# Patient Record
Sex: FEMALE | Race: BLACK OR AFRICAN AMERICAN | NOT HISPANIC OR LATINO | URBAN - METROPOLITAN AREA
[De-identification: names, ages, dates, MRNs, and addresses within clinical notes are randomized per-mention and may not be internally consistent; named-entity substitution may affect disease eponyms.]

---

## 2017-08-25 ENCOUNTER — HOSPITAL ENCOUNTER (EMERGENCY)
Facility: CLINIC | Age: 47
Discharge: HOME | End: 2017-08-26
Attending: EMERGENCY MEDICINE | Admitting: EMERGENCY MEDICINE
Payer: COMMERCIAL

## 2017-08-25 DIAGNOSIS — R00.2 PALPITATIONS: ICD-10-CM

## 2017-08-25 LAB
ANION GAP SERPL CALCULATED.3IONS-SCNC: 8 MMOL/L (ref 3–14)
BASOPHILS # BLD AUTO: 0 10E9/L (ref 0–0.2)
BASOPHILS NFR BLD AUTO: 0.3 %
BUN SERPL-MCNC: 15 MG/DL (ref 7–30)
CALCIUM SERPL-MCNC: 7.8 MG/DL (ref 8.5–10.1)
CHLORIDE SERPL-SCNC: 114 MMOL/L (ref 94–109)
CO2 SERPL-SCNC: 21 MMOL/L (ref 20–32)
CREAT SERPL-MCNC: 0.72 MG/DL (ref 0.52–1.04)
DIFFERENTIAL METHOD BLD: ABNORMAL
EOSINOPHIL # BLD AUTO: 0.2 10E9/L (ref 0–0.7)
EOSINOPHIL NFR BLD AUTO: 5.1 %
ERYTHROCYTE [DISTWIDTH] IN BLOOD BY AUTOMATED COUNT: 13.4 % (ref 10–15)
GFR SERPL CREATININE-BSD FRML MDRD: 87 ML/MIN/1.7M2
GLUCOSE SERPL-MCNC: 76 MG/DL (ref 70–99)
HCT VFR BLD AUTO: 37.3 % (ref 35–47)
HGB BLD-MCNC: 12.3 G/DL (ref 11.7–15.7)
IMM GRANULOCYTES # BLD: 0 10E9/L (ref 0–0.4)
IMM GRANULOCYTES NFR BLD: 0 %
LYMPHOCYTES # BLD AUTO: 1.7 10E9/L (ref 0.8–5.3)
LYMPHOCYTES NFR BLD AUTO: 55.4 %
MCH RBC QN AUTO: 31.4 PG (ref 26.5–33)
MCHC RBC AUTO-ENTMCNC: 33 G/DL (ref 31.5–36.5)
MCV RBC AUTO: 95 FL (ref 78–100)
MONOCYTES # BLD AUTO: 0.2 10E9/L (ref 0–1.3)
MONOCYTES NFR BLD AUTO: 7.1 %
NEUTROPHILS # BLD AUTO: 1 10E9/L (ref 1.6–8.3)
NEUTROPHILS NFR BLD AUTO: 32.1 %
NRBC # BLD AUTO: 0 10*3/UL
NRBC BLD AUTO-RTO: 0 /100
PLATELET # BLD AUTO: 189 10E9/L (ref 150–450)
POTASSIUM SERPL-SCNC: 3.1 MMOL/L (ref 3.4–5.3)
RBC # BLD AUTO: 3.92 10E12/L (ref 3.8–5.2)
SODIUM SERPL-SCNC: 143 MMOL/L (ref 133–144)
TROPONIN I SERPL-MCNC: <0.015 UG/L (ref 0–0.04)
WBC # BLD AUTO: 3.1 10E9/L (ref 4–11)

## 2017-08-25 PROCEDURE — 99284 EMERGENCY DEPT VISIT MOD MDM: CPT | Mod: 25 | Performed by: EMERGENCY MEDICINE

## 2017-08-25 PROCEDURE — 84484 ASSAY OF TROPONIN QUANT: CPT | Performed by: EMERGENCY MEDICINE

## 2017-08-25 PROCEDURE — 80048 BASIC METABOLIC PNL TOTAL CA: CPT | Performed by: EMERGENCY MEDICINE

## 2017-08-25 PROCEDURE — 96365 THER/PROPH/DIAG IV INF INIT: CPT | Performed by: EMERGENCY MEDICINE

## 2017-08-25 PROCEDURE — 93005 ELECTROCARDIOGRAM TRACING: CPT | Performed by: EMERGENCY MEDICINE

## 2017-08-25 PROCEDURE — 85025 COMPLETE CBC W/AUTO DIFF WBC: CPT | Performed by: EMERGENCY MEDICINE

## 2017-08-25 PROCEDURE — 25000128 H RX IP 250 OP 636: Performed by: EMERGENCY MEDICINE

## 2017-08-25 PROCEDURE — 93010 ELECTROCARDIOGRAM REPORT: CPT | Mod: Z6 | Performed by: EMERGENCY MEDICINE

## 2017-08-25 RX ORDER — POTASSIUM CL/LIDO/0.9 % NACL 10MEQ/0.1L
10 INTRAVENOUS SOLUTION, PIGGYBACK (ML) INTRAVENOUS ONCE
Status: COMPLETED | OUTPATIENT
Start: 2017-08-25 | End: 2017-08-26

## 2017-08-25 RX ADMIN — Medication 10 MEQ: at 23:19

## 2017-08-25 ASSESSMENT — ENCOUNTER SYMPTOMS
LIGHT-HEADEDNESS: 1
PALPITATIONS: 1
CHEST TIGHTNESS: 1

## 2017-08-25 NOTE — ED AVS SNAPSHOT
" [facility], Emergency Department    [address] [address]    Phone:  [phone]                                       Asa Francis   MRN: [id number]    Department:  [facility], Emergency Department   Date of Visit:  8/25/2017           Patient Information     Date Of Birth          1970        Your diagnoses for this visit were:     Palpitations        You were seen by Raza Cheng MD.        Discharge Instructions       Please make an appointment to follow up with Cardiology Clinic (phone: [phone]) as soon as possible.    Eat and drink plenty of fluids.          *HEART PALPITATIONS    Palpitations refers to the feeling that your heart is beating hard, fast or irregular. Some people describe it as \"pounding\" or \"skipped beats\". Palpitations may occur in persons with heart disease, but can also occur in healthy persons. Your doctor does not believe that anything dangerous is causing your symptoms at this time.  Heart-related causes:    Arrhythmia (a change from the heart's normal rhythm)    Disease of the heart valves  Non-heart-related causes:    Certain medicines (such as asthma inhalers and decongestants)    Some herbal supplements, energy drinks and pills, and weight loss pills    Illegal stimulant drugs (such as cocaine, crank, methamphetamine, PCP)    Caffeine, alcohol and tobacco    Medical conditions such as thyroid disease, anemia, anxiety and panic disorder  Sometimes the cause cannot be found.  HOME CARE:  1. Avoid excess caffeine, alcohol, tobacco and any stimulant drugs.  2. Tell your doctor about any prescription or over-the-counter or herbal medicines you take.  FOLLOW UP with your doctor or as advised by our staff.  GET PROMPT MEDICAL ATTENTION if any of the following occur together with palpitations:    Weakness, dizziness, light-headed or fainting    Chest pain or shortness of breath    Rapid heart rate (over 120 beats per minute, at " rest)    Palpitations that lasts over 20 minutes    Weakness of an arm or leg or one side of the face    Difficulty with speech or vision    8437-7320 [employer], [address] [place] [address], [place] [address]. All rights reserved. This information is not intended as a substitute for professional medical care. Always follow your healthcare professional's instructions.        24 Hour Appointment Hotline       To make an appointment at any [facility], call [phone] ([phone]). If you don't have a family doctor or clinic, we will help you find one. [facility] are conveniently located to serve the needs of you and your family.          ED Discharge Orders     Follow up with Cardiology       You should receive a call from [facility] to schedule your follow up appointment.  If you do not hear from them within 3 business days call [phone] for help in scheduling your testing and follow up.                     Review of your medicines      Our records show that you are taking the medicines listed below. If these are incorrect, please call your family doctor or clinic.        Dose / Directions Last dose taken    albuterol 108 (90 BASE) MCG/ACT Inhaler   Commonly known as:  PROAIR HFA/PROVENTIL HFA/VENTOLIN HFA   Dose:  2 puff        Inhale 2 puffs into the lungs every 6 hours   Refills:  0        cholecalciferol 1000 UNIT tablet   Commonly known as:  vitamin D   Dose:  1000 Units        Take 1,000 Units by mouth   Refills:  0        cyanocobalamin 1000 MCG Tabs   Dose:  1000 mcg        Take 1,000 mcg by mouth   Refills:  0        EUCERIN Lotn        Apply to face/ body twice daily as needed.   Refills:  0        loratadine 10 MG tablet   Commonly known as:  CLARITIN   Dose:  10 mg        Take 10 mg by mouth   Refills:  0        LORAZEPAM PO   Dose:  0.5 mg        Take 0.5 mg by mouth every 8 hours as needed for anxiety   Refills:  0        meclizine  12.5 MG tablet   Commonly known as:  ANTIVERT   Dose:  12.5 mg   Quantity:  20 tablet        Take 1 tablet (12.5 mg) by mouth 3 times daily as needed for dizziness   Refills:  0        MIRTAZAPINE PO   Dose:  45 mg        Take 45 mg by mouth At Bedtime   Refills:  0        NIFEDIPINE PO   Dose:  10 mg        Take 10 mg by mouth 2 times daily   Refills:  0        ondansetron 4 MG ODT tab   Commonly known as:  ZOFRAN-ODT   Dose:  4 mg   Quantity:  20 tablet        Take 1 tablet (4 mg) by mouth every 6 hours as needed for nausea   Refills:  0        pantoprazole 20 MG EC tablet   Commonly known as:  PROTONIX   Quantity:  30 tablet        Take by mouth 30-60 minutes before a meal.   Refills:  0                Procedures and tests performed during your visit     Basic metabolic panel    CBC with platelets differential    Cardiac Continuous Monitoring    EKG 12-lead, tracing only    Troponin I      Orders Needing Specimen Collection     None      Pending Results     Date and Time Order Name Status Description    8/25/2017 2151 EKG 12-lead, tracing only Preliminary             Pending Culture Results     No orders found for last 3 day(s).            Pending Results Instructions     If you had any lab results that were not finalized at the time of your Discharge, you can call the ED Lab Result RN at [phone]. You will be contacted by this team for any positive Lab results or changes in treatment. The nurses are available 7 days a week from 10A to 6:30P.  You can leave a message 24 hours per day and they will return your call.        Thank you for choosing [facility]       Thank you for choosing [facility] for your care. Our goal is always to provide you with excellent care. Hearing back from our patients is one way we can continue to improve our services. Please take a few minutes to complete the written survey that you may receive in the mail after you visit with us. Thank you!        MyChart Information     [facility] lets  "you send messages to your doctor, view your test results, renew your prescriptions, schedule appointments and more. To sign up, go to [email or web address] . Click on \"Log in\" on the left side of the screen, which will take you to the Welcome page. Then click on \"Sign up Now\" on the right side of the page.     You will be asked to enter the access code listed below, as well as some personal information. Please follow the directions to create your username and password.     Your access code is: [email or web address]  Expires: 2017 12:39 AM     Your access code will  in 90 days. If you need help or a new code, please call your [place] clinic or [phone].        Care EveryWhere ID     This is your Care EveryWhere ID. This could be used by other organizations to access your [place] medical records  [id number]        Equal Access to Services     GENEVIEVE GIBBS : Cyndi [place], waaxsukumar kaur, qaybta kaalmasukumar beltre, evelia wallace . So [facility] [phone].    ATENCIÓN: Si habla español, tiene a salazar disposición servicios gratuitos de asistencia lingüística. Llame al [phone].    We comply with applicable federal civil rights laws and [place] laws. We do not discriminate on the basis of race, color, national origin, age, disability sex, sexual orientation or gender identity.            After Visit Summary       This is your record. Keep this with you and show to your community pharmacist(s) and doctor(s) at your next visit.                  "

## 2017-08-25 NOTE — ED AVS SNAPSHOT
[facility], [place], Emergency Department    [address] [place] [address]    Phone:  [phone]                                       Asa Francis   MRN: [id number]    Department:  [facility], Emergency Department   Date of Visit:  8/25/2017           After Visit Summary Signature Page     I have received my discharge instructions, and my questions have been answered. I have discussed any challenges I see with this plan with the nurse or doctor.    ..........................................................................................................................................  Patient/Patient Representative Signature      ..........................................................................................................................................  Patient Representative Print Name and Relationship to Patient    ..................................................               ................................................  Date                                            Time    ..........................................................................................................................................  Reviewed by Signature/Title    ...................................................              ..............................................  Date                                                            Time

## 2017-08-26 VITALS
SYSTOLIC BLOOD PRESSURE: 112 MMHG | OXYGEN SATURATION: 100 % | TEMPERATURE: 97.8 F | DIASTOLIC BLOOD PRESSURE: 75 MMHG | RESPIRATION RATE: 16 BRPM

## 2017-08-26 NOTE — ED PROVIDER NOTES
History     Chief Complaint   Patient presents with     Palpitations     A  was used (Gibraltarian).     Asa Francis is a 47 year old female who presents with palpitations. The patient reports that she felt her heart racing around 9:00 PM today, which lasted several minutes, and reports that she was unable to move because she felt faint and lightheaded, she had to go down to the ground. She complains of chest congestion and chest tightness associated with the heart racing sensation. Currently, the patient reports that the heart racing and chest tightness have resolved and she feels back to her baseline. She states that she has had similar episodes in the past, though they only last a few minutes at a time, and tonight the episode lasted much longer. She notes that she has been evaluated for chest pain at [facility] in the past, however, she has not previously been evaluated for her heart racing. The patient reports that she has been fasting for the past 3 days, has not been eating anything, and only drank water after the onset of the heart racing today. Currently, the patient reports feeling improved.     Past Medical History:   Diagnosis Date     Diabetes (H)      Hypertension      MDD (major depressive disorder)      PTSD (post-traumatic stress disorder)        Past Surgical History:   Procedure Laterality Date     EYE SURGERY       Right Arm Surgery         No family history on file.    Social History   Substance Use Topics     Smoking status: Never Smoker     Smokeless tobacco: Not on file     Alcohol use No     No current facility-administered medications for this encounter.      Current Outpatient Prescriptions   Medication     meclizine (ANTIVERT) 12.5 MG tablet     ondansetron (ZOFRAN-ODT) 4 MG disintegrating tablet     pantoprazole (PROTONIX) 20 MG tablet     albuterol (PROAIR HFA, PROVENTIL HFA, VENTOLIN HFA) 108 (90 BASE) MCG/ACT inhaler     LORAZEPAM PO     NIFEDIPINE PO      MIRTAZAPINE PO     Emollient (EUCERIN) LOTN     cholecalciferol (VITAMIN D) 1000 UNIT tablet     cyanocobalamin 1000 MCG TABS     loratadine (CLARITIN) 10 MG tablet      No Known Allergies     I have reviewed the Medications, Allergies, Past Medical and Surgical History, and Social History in the Epic system.    Review of Systems   Respiratory: Positive for chest tightness.    Cardiovascular: Positive for palpitations (heart racing).   Neurological: Positive for light-headedness.   All other systems reviewed and are negative.      Physical Exam   SpO2: 100 %  Physical Exam   Constitutional: No distress.   HENT:   Head: Atraumatic.   Mouth/Throat: Oropharynx is clear and moist. No oropharyngeal exudate.   Eyes: Pupils are equal, round, and reactive to light. No scleral icterus.   Neck: Neck supple.   Cardiovascular: Normal heart sounds and intact distal pulses.    No murmur heard.  Pulmonary/Chest: Breath sounds normal. No respiratory distress. She has no wheezes. She has no rales.   Abdominal: Soft. Bowel sounds are normal. She exhibits no distension. There is no tenderness.   Musculoskeletal: She exhibits no edema or tenderness.   Skin: Skin is warm. No rash noted. She is not diaphoretic.       ED Course     ED Course     Procedures     10:29 PM  The patient was seen and examined by Dr. Cheng in Room 9.               EKG Interpretation:      Interpreted by Raza Cheng MD  Time reviewed: On arrival  Symptoms at time of EKG: palpitations   Rhythm: normal sinus   Rate: 82 bpm  Axis: Left Axis Deviation  Ectopy: none  Conduction: normal  ST Segments/ T Waves: No ST-T wave changes  Q Waves: none  Comparison to prior: Unchanged    Clinical Impression: no acute changes            Critical Care time:  none           Labs Ordered and Resulted from Time of ED Arrival Up to the Time of Departure from the ED   CBC WITH PLATELETS DIFFERENTIAL - Abnormal; Notable for the following:        Result Value    WBC 3.1 (*)      Absolute Neutrophil 1.0 (*)     All other components within normal limits   BASIC METABOLIC PANEL - Abnormal; Notable for the following:     Potassium 3.1 (*)     Chloride 114 (*)     Calcium 7.8 (*)     All other components within normal limits   TROPONIN I   CARDIAC CONTINUOUS MONITORING   PULSE OXIMETRY NURSING            Assessments & Plan (with Medical Decision Making)   The patient had an episode of near syncope and was found to have a narrow complex tachycardia by medics.  The rhythm completing marches out with equal R to R intervals.  She has been fasting for several days and was somewhat dehydrated and may have had sinus tachycardia but given her age and the significant improvement to a heart rate of 85 with 1 L normal saline this is more likely an episode of SVT.  She does have a mildly prolonged QTC, however the tachycardia was narrow complex and she has no prior episodes of tachycardia or syncope.  She felt better after normal saline.  She was given IV potassium to replace her level and will go home and eat.  She will follow up with cardiology for the possible SVT to consider an event monitor.    I have reviewed the nursing notes.    I have reviewed the findings, diagnosis, plan and need for follow up with the patient.    Discharge Medication List as of 8/26/2017 12:39 AM          Final diagnoses:   Palpitations     IAmy, am serving as a trained medical scribe to document services personally performed by Raza Cheng MD, based on the provider's statements to me.   IRaza MD, was physically present and have reviewed and verified the accuracy of this note documented by Amy Bashir.    8/25/2017   [facility], [place], EMERGENCY DEPARTMENT     Raza Cheng MD  08/26/17 0107

## 2017-08-26 NOTE — ED NOTES
Patient has been fasting for an unknown amount of time. Patient developed chest pain, tachy for 's-170's, with hypotension. Pt received bolus of fluid in route and 325 of asa. HR 82 upon arrival to ED

## 2017-08-26 NOTE — DISCHARGE INSTRUCTIONS
"Please make an appointment to follow up with Cardiology Clinic (phone: [phone]) as soon as possible.    Eat and drink plenty of fluids.          *HEART PALPITATIONS    Palpitations refers to the feeling that your heart is beating hard, fast or irregular. Some people describe it as \"pounding\" or \"skipped beats\". Palpitations may occur in persons with heart disease, but can also occur in healthy persons. Your doctor does not believe that anything dangerous is causing your symptoms at this time.  Heart-related causes:    Arrhythmia (a change from the heart's normal rhythm)    Disease of the heart valves  Non-heart-related causes:    Certain medicines (such as asthma inhalers and decongestants)    Some herbal supplements, energy drinks and pills, and weight loss pills    Illegal stimulant drugs (such as cocaine, crank, methamphetamine, PCP)    Caffeine, alcohol and tobacco    Medical conditions such as thyroid disease, anemia, anxiety and panic disorder  Sometimes the cause cannot be found.  HOME CARE:  1. Avoid excess caffeine, alcohol, tobacco and any stimulant drugs.  2. Tell your doctor about any prescription or over-the-counter or herbal medicines you take.  FOLLOW UP with your doctor or as advised by our staff.  GET PROMPT MEDICAL ATTENTION if any of the following occur together with palpitations:    Weakness, dizziness, light-headed or fainting    Chest pain or shortness of breath    Rapid heart rate (over 120 beats per minute, at rest)    Palpitations that lasts over 20 minutes    Weakness of an arm or leg or one side of the face    Difficulty with speech or vision    2245-8401 [employer], [address] [address], [place] [address]. All rights reserved. This information is not intended as a substitute for professional medical care. Always follow your healthcare professional's instructions.      "

## 2017-08-26 NOTE — ED NOTES
Bed: ED09  Expected date:   Expected time:   Means of arrival:   Comments:  H 426  47 F  Palpitations, tachycardia

## 2017-08-27 LAB — INTERPRETATION ECG - MUSE: NORMAL

## 2017-09-25 ENCOUNTER — OFFICE VISIT (OUTPATIENT)
Dept: CARDIOLOGY | Facility: CLINIC | Age: 47
End: 2017-09-25
Attending: INTERNAL MEDICINE
Payer: COMMERCIAL

## 2017-09-25 ENCOUNTER — PRE VISIT (OUTPATIENT)
Dept: CARDIOLOGY | Facility: CLINIC | Age: 47
End: 2017-09-25

## 2017-09-25 VITALS
SYSTOLIC BLOOD PRESSURE: 107 MMHG | BODY MASS INDEX: 26.61 KG/M2 | OXYGEN SATURATION: 98 % | DIASTOLIC BLOOD PRESSURE: 71 MMHG | WEIGHT: 165.6 LBS | HEIGHT: 66 IN | HEART RATE: 71 BPM

## 2017-09-25 DIAGNOSIS — I47.10 PAROXYSMAL SUPRAVENTRICULAR TACHYCARDIA (H): ICD-10-CM

## 2017-09-25 DIAGNOSIS — R00.2 PALPITATIONS: Primary | ICD-10-CM

## 2017-09-25 DIAGNOSIS — R00.2 PALPITATIONS: ICD-10-CM

## 2017-09-25 LAB
ANION GAP SERPL CALCULATED.3IONS-SCNC: 6 MMOL/L (ref 3–14)
BUN SERPL-MCNC: 9 MG/DL (ref 7–30)
CALCIUM SERPL-MCNC: 8.9 MG/DL (ref 8.5–10.1)
CHLORIDE SERPL-SCNC: 105 MMOL/L (ref 94–109)
CO2 SERPL-SCNC: 27 MMOL/L (ref 20–32)
CREAT SERPL-MCNC: 0.74 MG/DL (ref 0.52–1.04)
GFR SERPL CREATININE-BSD FRML MDRD: 84 ML/MIN/1.7M2
GLUCOSE SERPL-MCNC: 106 MG/DL (ref 70–99)
POTASSIUM SERPL-SCNC: 3.8 MMOL/L (ref 3.4–5.3)
SODIUM SERPL-SCNC: 138 MMOL/L (ref 133–144)

## 2017-09-25 PROCEDURE — 36415 COLL VENOUS BLD VENIPUNCTURE: CPT | Performed by: INTERNAL MEDICINE

## 2017-09-25 PROCEDURE — 99204 OFFICE O/P NEW MOD 45 MIN: CPT | Mod: ZP | Performed by: INTERNAL MEDICINE

## 2017-09-25 PROCEDURE — 80048 BASIC METABOLIC PNL TOTAL CA: CPT | Performed by: INTERNAL MEDICINE

## 2017-09-25 PROCEDURE — 99213 OFFICE O/P EST LOW 20 MIN: CPT | Mod: ZF

## 2017-09-25 ASSESSMENT — PAIN SCALES - GENERAL: PAINLEVEL: NO PAIN (0)

## 2017-09-25 NOTE — PATIENT INSTRUCTIONS
Labs today.   Schedule echocardiogram and heart monitor soon .   Follow up with Dr. Meza in 2 weeks.     Thank you for your visit today!    Please call [phone] with any questions or concersn.

## 2017-09-25 NOTE — TELEPHONE ENCOUNTER
Echo : 5/31/17  Left Ventricle:     Normal LV size, wall thickness, and systolic function. No gross regional wall motion                abnormalities identified.    Right Ventricle:    Normal RV size and function.    Left Atrium:        Normal size left atrium.    Right Atrium:       Normal size right atrium.    Aortic Valve:       Grossly normal in appearance and function.    Mitral Valve:       Mild to moderate mitral regurgitation.    Tricuspid Valve:    Mild tricuspid regurgitation.    Pulmonic Valve:     Trace pulmonic regurgitation.    Aorta:              Mildly dilated aortic root.    Pericardium:        No gross pericardial effusion.    IVC:    IAS:                Interatrial septum appears intact.     EKG : 8/25/17

## 2017-09-25 NOTE — NURSING NOTE
Chief Complaint   Patient presents with     New Patient     48 y/o female for evaluation of palpitations     Vitals were taken and medications were reconciled.  ERIK Doe  4:52 PM

## 2017-09-25 NOTE — LETTER
9/25/2017      RE: Asa Francis  [address]   [facility] [place] [address]       Dear Colleague,    Thank you for the opportunity to participate in the care of your patient, Asa Francis, at the [facility]ART [facility] at [facility]. Please see a copy of my visit note below.    Chief complaint: Palpitations    HPI: A  was used (Nicaraguan).   Ms. Asa Francis is a 47 year old  female who presents today with palpitations for the last 4 months. She reports daily palpitations once or twice a day for up to 15 min. She usually feels dizzy and chest pain during palpitations. On 8/25/2017 she felt palpitations again which lasted for about 45 min, associated with dizziness and she called paramedics. Paramedics took an ECG which showed narrow complex tachycardia (Regular RR intervals,  bpm). Then she presented to ER and her rhythm apperantly terminated spontaneously since ED provider notes doesnot mention about any medication being administered to terminate the tachycardia. She only received IV saline in ER and discharged home to follow up with cardiology. On September 6, 2017 she felt palpitations again while she was in bed that persisted for some time. Palpitations did not stop, she felt like she had an urged feeling to urinate, got up from bed and felt dizzy and fell on to the floor. She hit her head and had a big bruise on her forehead. Bruise is still seen today on her forehead.There wasnot anbody who witnessed the event. She was not able to get up, she stayed on the floor for some time, then she somehow managed to drink water and her palpitations stopped. She reports usually feeling very weak when her heart starts racing and she usually has to sit down. Her palpitations start gradually and stop gradually.  She has no known cardiac disease. She denies PND, ortopnea or LE edema      Risk factors: HT(-), DM(-), smoking (-), FH of palpitations  or CAD (-)            Personal, family, and social history reviewed with patient and revised.    PAST MEDICAL HISTORY:  PTSD    CURRENT MEDICATIONS:  Current Outpatient Prescriptions   Medication Sig Dispense Refill     meclizine (ANTIVERT) 12.5 MG tablet Take 1 tablet (12.5 mg) by mouth 3 times daily as needed for dizziness 20 tablet 0     ondansetron (ZOFRAN-ODT) 4 MG disintegrating tablet Take 1 tablet (4 mg) by mouth every 6 hours as needed for nausea 20 tablet 0     pantoprazole (PROTONIX) 20 MG tablet Take by mouth 30-60 minutes before a meal. 30 tablet 0     albuterol (PROAIR HFA, PROVENTIL HFA, VENTOLIN HFA) 108 (90 BASE) MCG/ACT inhaler Inhale 2 puffs into the lungs every 6 hours       LORAZEPAM PO Take 0.5 mg by mouth every 8 hours as needed for anxiety       MIRTAZAPINE PO Take 45 mg by mouth At Bedtime       Emollient (EUCERIN) LOTN Apply to face/ body twice daily as needed.       cholecalciferol (VITAMIN D) 1000 UNIT tablet Take 1,000 Units by mouth       cyanocobalamin 1000 MCG TABS Take 1,000 mcg by mouth       loratadine (CLARITIN) 10 MG tablet Take 10 mg by mouth         PAST SURGICAL HISTORY:  Past Surgical History:   Procedure Laterality Date     EYE SURGERY       Right Arm Surgery         ALLERGIES:   No Known Allergies    FAMILY HISTORY:  No family history on file.      SOCIAL HISTORY:  Social History   Substance Use Topics     Smoking status: Never Smoker     Smokeless tobacco: Not on file     Alcohol use No       ROS:   Constitutional: No fever, chills, or sweats. Weight stable.   ENT: No visual disturbance, ear ache, epistaxis, sore throat.   Cardiovascular: As per HPI.   Respiratory: No cough, hemoptysis.    GI: No nausea, vomiting, hematemesis, melena, or hematochezia.   : No hematuria.   Integument: Negative.   Psychiatric: Negative.   Hematologic:  Easy bruising, no easy bleeding.  Neuro: Negative.   Endocrinology: No significant heat or cold intolerance   Musculoskeletal: No  "myalgia.    Exam:  /71 (BP Location: Right arm, Patient Position: Chair, Cuff Size: Adult Regular)  Pulse 71  Ht 1.676 m (5' 6\")  Wt 75.1 kg (165 lb 9.6 oz)  SpO2 98%  BMI 26.73 kg/m2  GENERAL APPEARANCE: healthy, alert and no distress  HEENT: no icterus, no central cyanosis  LYMPH/NECK: no adenopathy, no asymmetry, JVP not elevated, no carotid bruits.  RESPIRATORY: lungs clear to auscultation - no rales, rhonchi or wheezes, no use of accessory muscles, no retractions, respirations are unlabored, normal respiratory rate  CARDIOVASCULAR: regular rhythm, normal S1, S2, no S3 or S4 and no murmur, click or rub, precordium quiet with normal PMI.  GI: soft, non tender  EXTREMITIES: peripheral pulses normal, no edema  NEURO: alert and oriented to person/place/time, normal speech,and affect  VASC: Radial, dorsalis pedis and posterior tibialis pulses are normal in volumes and symmetric bilaterally.   SKIN: ecchymose on right side of her forehead , no rashes     I have reviewed the labs and personally reviewed the EKG below and made my comment in the assessment and plan.    Labs:  CBC RESULTS:   Lab Results   Component Value Date    WBC 3.1 (L) 08/25/2017    RBC 3.92 08/25/2017    HGB 12.3 08/25/2017    HCT 37.3 08/25/2017    MCV 95 08/25/2017    MCH 31.4 08/25/2017    MCHC 33.0 08/25/2017    RDW 13.4 08/25/2017     08/25/2017       BMP RESULTS:  Lab Results   Component Value Date     09/25/2017    POTASSIUM 3.8 09/25/2017    CHLORIDE 105 09/25/2017    CO2 27 09/25/2017    ANIONGAP 6 09/25/2017     (H) 09/25/2017    BUN 9 09/25/2017    CR 0.74 09/25/2017    GFRESTIMATED 84 09/25/2017    GFRESTBLACK >90 09/25/2017    REJI 8.9 09/25/2017        INR RESULTS:  Lab Results   Component Value Date    INR 1.11 03/02/2015       Procedures:  Echocardiogram (3/3/2015)  Normal dobutamine stress echocardiogram. No wall motion abnormalities at rest   and with dobutamine infusion. Normal LV size and function at " rest and with   dobutamine infusion with LVEF increasing from 55-60% to 70-75%. No ECG   abnormalities at rest and with dobutamine infusion. No chest pain at rest and   with dobutamine infusion. No significant valvular abnormalities on screening   baseline images.    EKG dated 8/25/2017 showed SR,  V1-V3 with T wave inversion.    EKG dated 8/25/2017 shows narrow complex regular tachycardia at a heart rate of 150 bpm. Possible AVNRT    Assessment and Plan:     Mrs. Francis is a 47 year old lady who has started feeling palpitations for the last 4 months. No prior cardiac disease. She had two long lasting episodes. She has a documented SVT during one of these episodes that she eventually presented to ED on 8/25/2017. She recently had another long episode associated with dizziness and syncope. She hit her head and still has a healing bruise on her forehead. I have discussed with her the treatment options being medical therapy versus catheter ablation. Medical therapy doesnot prevent recurrences in 100% of the patients. She also has low BP which might make medical therapy challenging. I will obtain TTE and refer her to EP evaluation for further discussion of catheter ablation treatment. She is also thinking catheter ablation is a better option than medical therapy given long term success rates. In the mean time before her visit in EP she will wear a Zio Patch to further capture her daily episodes.     It was my absolute pleasure to meet  in the office today. Please donot hesitate to contact me if you have any questions or concerns.    Luis Eduardo LANCE MD  [facility] Division of Cardiology  Pager [phone]

## 2017-09-25 NOTE — MR AVS SNAPSHOT
After Visit Summary   9/25/2017    Asa Francis    MRN: [id number]           Patient Information     Date Of Birth          1970        Visit Information        Provider Department      9/25/2017 4:45 PM Luis Eduardo Callaway MD; [employer] [facility]        Today's Diagnoses     Palpitations    -  1      Care Instructions    Labs today.   Schedule echocardiogram and heart monitor soon .   Follow up with Dr. Meza in 2 weeks.     Thank you for your visit today!    Please call [phone] with any questions or concersn.                 Follow-ups after your visit        Your next 10 appointments already scheduled     Sep 25, 2017  6:45 PM CDT   Lab with  LAB   [facility] ([facility])    [address] [place] [id number] [phone]            Sep 27, 2017  2:00 PM CDT   Ech Complete with [id number]   [facility] ([facility])    [address] [place] [id number] [phone]           1. Please bring or wear a comfortable two-piece outfit. 2. You may eat, drink and take your normal medicines. 3. For any questions that cannot be answered, please contact the ordering physician            Sep 27, 2017  3:00 PM CDT   (Arrive by 2:45 PM)   HOLTER MONITOR VISIT with  Cvc Monitor Tech, TH   [facility] ([facility])    [address] [place] [id number] [phone]            Oct 09, 2017  4:30 PM CDT   (Arrive by 4:15 PM)   NEW ARRHYTHMIA with Cuco Meza MD   [facility] ([facility])    [address] [place] [id number] [phone]              Future tests that were ordered for you today     Open Future Orders        Priority Expected Expires Ordered    Basic metabolic panel Routine 9/30/2017 9/30/2017 9/25/2017    Echocardiogram Routine   "2018    Zio Patch Holter Routine  2017            Who to contact     If you have questions or need follow up information about today's clinic visit or your schedule please contact [facility] directly at [phone].  Normal or non-critical lab and imaging results will be communicated to you by MyChart, letter or phone within 4 business days after the clinic has received the results. If you do not hear from us within 7 days, please contact the clinic through [employer] or phone. If you have a critical or abnormal lab result, we will notify you by phone as soon as possible.  Submit refill requests through [employer] or call your pharmacy and they will forward the refill request to us. Please allow 3 business days for your refill to be completed.          Additional Information About Your Visit        MyChart Information     [employer] lets you send messages to your doctor, view your test results, renew your prescriptions, schedule appointments and more. To sign up, go to [email or web address] . Click on \"Log in\" on the left side of the screen, which will take you to the Welcome page. Then click on \"Sign up Now\" on the right side of the page.     You will be asked to enter the access code listed below, as well as some personal information. Please follow the directions to create your username and password.     Your access code is: [email or web address]  Expires: 2017 12:39 AM     Your access code will  in 90 days. If you need help or a new code, please call your [place] clinic or [phone].        Care EveryWhere ID     This is your Care EveryWhere ID. This could be used by other organizations to access your [facility] medical records  [id number]        Your Vitals Were     Pulse Height Pulse Oximetry BMI (Body Mass Index)          71 1.676 m (5' 6\") 98% 26.73 kg/m2         Blood Pressure from Last 3 Encounters:   17 107/71   17 112/75   03/03/15 93/62    Weight from " Last 3 Encounters:   09/25/17 75.1 kg (165 lb 9.6 oz)   03/02/15 86.6 kg (191 lb)                 Today's Medication Changes          These changes are accurate as of: 9/25/17  6:16 PM.  If you have any questions, ask your nurse or doctor.               Stop taking these medicines if you haven't already. Please contact your care team if you have questions.     NIFEDIPINE PO   Stopped by:  Luis Eduardo Callaway MD                    Primary Care Provider Office Phone # Fax #    [facility] [phone]       [address] [place], [place] [id number]        Equal Access to Services     [facility]: Hadii raza serra hadasho [place], waaxda luqadaha, qaybta kaalmada adeomaryada, evelia wallace . So [facility] [phone].    ATENCIÓN: Si habla español, tiene a salazar disposición servicios gratuitos de asistencia lingüística. Llame al [phone].    We comply with applicable federal civil rights laws and [place] laws. We do not discriminate on the basis of race, color, national origin, age, disability sex, sexual orientation or gender identity.            Thank you!     Thank you for choosing [facility]  for your care. Our goal is always to provide you with excellent care. Hearing back from our patients is one way we can continue to improve our services. Please take a few minutes to complete the written survey that you may receive in the mail after your visit with us. Thank you!             Your Updated Medication List - Protect others around you: Learn how to safely use, store and throw away your medicines at www.disposemymeds.org.          This list is accurate as of: 9/25/17  6:16 PM.  Always use your most recent med list.                   Brand Name Dispense Instructions for use Diagnosis    albuterol 108 (90 BASE) MCG/ACT Inhaler    PROAIR HFA/PROVENTIL HFA/VENTOLIN HFA     Inhale 2 puffs into the lungs every 6 hours        cholecalciferol 1000 UNIT tablet    vitamin  D     Take 1,000 Units by mouth        cyanocobalamin 1000 MCG Tabs      Take 1,000 mcg by mouth        EUCERIN Lotn      Apply to face/ body twice daily as needed.        loratadine 10 MG tablet    CLARITIN     Take 10 mg by mouth        LORAZEPAM PO      Take 0.5 mg by mouth every 8 hours as needed for anxiety        meclizine 12.5 MG tablet    ANTIVERT    20 tablet    Take 1 tablet (12.5 mg) by mouth 3 times daily as needed for dizziness    Dizziness       MIRTAZAPINE PO      Take 45 mg by mouth At Bedtime        ondansetron 4 MG ODT tab    ZOFRAN-ODT    20 tablet    Take 1 tablet (4 mg) by mouth every 6 hours as needed for nausea    Nausea       pantoprazole 20 MG EC tablet    PROTONIX    30 tablet    Take by mouth 30-60 minutes before a meal.    Esophageal reflux

## 2017-09-27 ENCOUNTER — ALLIED HEALTH/NURSE VISIT (OUTPATIENT)
Dept: CARDIOLOGY | Facility: CLINIC | Age: 47
End: 2017-09-27
Attending: INTERNAL MEDICINE
Payer: COMMERCIAL

## 2017-09-27 ENCOUNTER — RADIANT APPOINTMENT (OUTPATIENT)
Dept: CARDIOLOGY | Facility: CLINIC | Age: 47
End: 2017-09-27
Attending: INTERNAL MEDICINE

## 2017-09-27 DIAGNOSIS — R00.2 PALPITATIONS: ICD-10-CM

## 2017-09-27 PROCEDURE — 0296T ZIO PATCH HOLTER: CPT | Mod: ZF

## 2017-09-27 PROCEDURE — 0298T ZZC EXT ECG > 48HR TO 21 DAY REVIEW AND INTERPRETATN: CPT | Performed by: INTERNAL MEDICINE

## 2017-10-01 NOTE — PROGRESS NOTES
Chief complaint: Palpitations    HPI: A  was used ([place]alian).   Ms. Asa Francis is a 47 year old  female who presents today with palpitations for the last 4 months. She reports daily palpitations once or twice a day for up to 15 min. She usually feels dizzy and chest pain during palpitations. On 8/25/2017 she felt palpitations again which lasted for about 45 min, associated with dizziness and she called paramedics. Paramedics took an ECG which showed narrow complex tachycardia (Regular RR intervals,  bpm). Then she presented to ER and her rhythm apperantly terminated spontaneously since ED provider notes doesnot mention about any medication being administered to terminate the tachycardia. She only received IV saline in ER and discharged home to follow up with cardiology. On September 6, 2017 she felt palpitations again while she was in bed that persisted for some time. Palpitations did not stop, she felt like she had an urged feeling to urinate, got up from bed and felt dizzy and fell on to the floor. She hit her head and had a big bruise on her forehead. Bruise is still seen today on her forehead.There wasnot anbody who witnessed the event. She was not able to get up, she stayed on the floor for some time, then she somehow managed to drink water and her palpitations stopped. She reports usually feeling very weak when her heart starts racing and she usually has to sit down. Her palpitations start gradually and stop gradually.  She has no known cardiac disease. She denies PND, ortopnea or LE edema      Risk factors: HT(-), DM(-), smoking (-), FH of palpitations or CAD (-)            Personal, family, and social history reviewed with patient and revised.    PAST MEDICAL HISTORY:  PTSD    CURRENT MEDICATIONS:  Current Outpatient Prescriptions   Medication Sig Dispense Refill     meclizine (ANTIVERT) 12.5 MG tablet Take 1 tablet (12.5 mg) by mouth 3 times daily as needed for dizziness 20  "tablet 0     ondansetron (ZOFRAN-ODT) 4 MG disintegrating tablet Take 1 tablet (4 mg) by mouth every 6 hours as needed for nausea 20 tablet 0     pantoprazole (PROTONIX) 20 MG tablet Take by mouth 30-60 minutes before a meal. 30 tablet 0     albuterol (PROAIR HFA, PROVENTIL HFA, VENTOLIN HFA) 108 (90 BASE) MCG/ACT inhaler Inhale 2 puffs into the lungs every 6 hours       LORAZEPAM PO Take 0.5 mg by mouth every 8 hours as needed for anxiety       MIRTAZAPINE PO Take 45 mg by mouth At Bedtime       Emollient (EUCERIN) LOTN Apply to face/ body twice daily as needed.       cholecalciferol (VITAMIN D) 1000 UNIT tablet Take 1,000 Units by mouth       cyanocobalamin 1000 MCG TABS Take 1,000 mcg by mouth       loratadine (CLARITIN) 10 MG tablet Take 10 mg by mouth         PAST SURGICAL HISTORY:  Past Surgical History:   Procedure Laterality Date     EYE SURGERY       Right Arm Surgery         ALLERGIES:   No Known Allergies    FAMILY HISTORY:  No family history on file.      SOCIAL HISTORY:  Social History   Substance Use Topics     Smoking status: Never Smoker     Smokeless tobacco: Not on file     Alcohol use No       ROS:   Constitutional: No fever, chills, or sweats. Weight stable.   ENT: No visual disturbance, ear ache, epistaxis, sore throat.   Cardiovascular: As per HPI.   Respiratory: No cough, hemoptysis.    GI: No nausea, vomiting, hematemesis, melena, or hematochezia.   : No hematuria.   Integument: Negative.   Psychiatric: Negative.   Hematologic:  Easy bruising, no easy bleeding.  Neuro: Negative.   Endocrinology: No significant heat or cold intolerance   Musculoskeletal: No myalgia.    Exam:  /71 (BP Location: Right arm, Patient Position: Chair, Cuff Size: Adult Regular)  Pulse 71  Ht 1.676 m (5' 6\")  Wt 75.1 kg (165 lb 9.6 oz)  SpO2 98%  BMI 26.73 kg/m2  GENERAL APPEARANCE: healthy, alert and no distress  HEENT: no icterus, no central cyanosis  LYMPH/NECK: no adenopathy, no asymmetry, JVP not " elevated, no carotid bruits.  RESPIRATORY: lungs clear to auscultation - no rales, rhonchi or wheezes, no use of accessory muscles, no retractions, respirations are unlabored, normal respiratory rate  CARDIOVASCULAR: regular rhythm, normal S1, S2, no S3 or S4 and no murmur, click or rub, precordium quiet with normal PMI.  GI: soft, non tender  EXTREMITIES: peripheral pulses normal, no edema  NEURO: alert and oriented to person/place/time, normal speech,and affect  VASC: Radial, dorsalis pedis and posterior tibialis pulses are normal in volumes and symmetric bilaterally.   SKIN: ecchymose on right side of her forehead , no rashes     I have reviewed the labs and personally reviewed the EKG below and made my comment in the assessment and plan.    Labs:  CBC RESULTS:   Lab Results   Component Value Date    WBC 3.1 (L) 08/25/2017    RBC 3.92 08/25/2017    HGB 12.3 08/25/2017    HCT 37.3 08/25/2017    MCV 95 08/25/2017    MCH 31.4 08/25/2017    MCHC 33.0 08/25/2017    RDW 13.4 08/25/2017     08/25/2017       BMP RESULTS:  Lab Results   Component Value Date     09/25/2017    POTASSIUM 3.8 09/25/2017    CHLORIDE 105 09/25/2017    CO2 27 09/25/2017    ANIONGAP 6 09/25/2017     (H) 09/25/2017    BUN 9 09/25/2017    CR 0.74 09/25/2017    GFRESTIMATED 84 09/25/2017    GFRESTBLACK >90 09/25/2017    REJI 8.9 09/25/2017        INR RESULTS:  Lab Results   Component Value Date    INR 1.11 03/02/2015       Procedures:  Echocardiogram (3/3/2015)  Normal dobutamine stress echocardiogram. No wall motion abnormalities at rest   and with dobutamine infusion. Normal LV size and function at rest and with   dobutamine infusion with LVEF increasing from 55-60% to 70-75%. No ECG   abnormalities at rest and with dobutamine infusion. No chest pain at rest and   with dobutamine infusion. No significant valvular abnormalities on screening   baseline images.    EKG dated 8/25/2017 showed SR,  V1-V3 with T wave inversion.    EKG  dated 8/25/2017 shows narrow complex regular tachycardia at a heart rate of 150 bpm. Possible AVNRT    Assessment and Plan:     Mrs. Francis is a 47 year old lady who has started feeling palpitations for the last 4 months. No prior cardiac disease. She had two long lasting episodes. She has a documented SVT during one of these episodes that she eventually presented to ED on 8/25/2017. She recently had another long episode associated with dizziness and syncope. She hit her head and still has a healing bruise on her forehead. I have discussed with her the treatment options being medical therapy versus catheter ablation. Medical therapy doesnot prevent recurrences in 100% of the patients. She also has low BP which might make medical therapy challenging. I will obtain TTE and refer her to EP evaluation for further discussion of catheter ablation treatment. She is also thinking catheter ablation is a better option than medical therapy given long term success rates. In the mean time before her visit in EP she will wear a Zio Patch to further capture her daily episodes.     It was my absolute pleasure to meet  in the office today. Please donot hesitate to contact me if you have any questions or concerns.    Luis Eduardo LANCE MD  [facility] Division of Cardiology  Pager [phone]

## 2017-10-02 NOTE — NURSING NOTE
Per Dr. Luis Eduardo Callaway, patient to have 14 day Zio monitor placed.  Diagnosis: Palpitations  Monitor placed: Yes  Patient Instructed: Yes  Patient verbalized understanding: Yes  Holter # [id number]  Placed by ERIK Doe

## 2017-10-09 ENCOUNTER — OFFICE VISIT (OUTPATIENT)
Dept: CARDIOLOGY | Facility: CLINIC | Age: 47
End: 2017-10-09
Attending: INTERNAL MEDICINE
Payer: COMMERCIAL

## 2017-10-09 ENCOUNTER — PRE VISIT (OUTPATIENT)
Dept: CARDIOLOGY | Facility: CLINIC | Age: 47
End: 2017-10-09

## 2017-10-09 VITALS
SYSTOLIC BLOOD PRESSURE: 109 MMHG | WEIGHT: 168.5 LBS | OXYGEN SATURATION: 98 % | HEART RATE: 77 BPM | HEIGHT: 66 IN | BODY MASS INDEX: 27.08 KG/M2 | DIASTOLIC BLOOD PRESSURE: 74 MMHG

## 2017-10-09 DIAGNOSIS — I47.10 PAROXYSMAL SUPRAVENTRICULAR TACHYCARDIA (H): Primary | ICD-10-CM

## 2017-10-09 PROCEDURE — 99213 OFFICE O/P EST LOW 20 MIN: CPT | Mod: ZF

## 2017-10-09 PROCEDURE — 99205 OFFICE O/P NEW HI 60 MIN: CPT | Mod: GC | Performed by: INTERNAL MEDICINE

## 2017-10-09 ASSESSMENT — PAIN SCALES - GENERAL: PAINLEVEL: NO PAIN (0)

## 2017-10-09 NOTE — PATIENT INSTRUCTIONS
You are scheduled for an SVT ablation, at [facility] with Dr. Ly. The hospital is located at [address] [address] on the [place] of the [place]. The phone number is: [phone].  If you need to cancel this procedure, please call [phone].    Date:__10/26/17____  Time: __7 AM_____To the [facility] Waiting Room at the [facility]  Supraventricular Tachycardia Ablation (SVT)    1. Your history and physical will be completed by our nurse practitioner when you arrive.  2. Please do not eat anything for 8 hours prior to your procedure. You may have sips of water up until 2 hours prior to your arrival.  3. You may stay in the hospital overnight, and will need a .    Please do not hesitate to utilize [employer] or call us if you have any questions or concerns.    Libra HERRERA Procedure   [phone]

## 2017-10-09 NOTE — LETTER
10/9/2017      RE: Asa Francis  [address]   [facility] [address]       Dear Colleague,    Thank you for the opportunity to participate in the care of your patient, Asa Francis, at the [facility] HEART [facility]. Please see a copy of my visit note below.    Cardiac Electrophysiology Clinic    Chief Complaint:  Symptomatic SVT    HPI:  Ms Francis is a 46 yo female referred by Dr. Luis Eduardo Callaway for management of symptomatic supraventricular tachycardia, once associated with lightheadedness and a fall resulting in head trauma in 8/2017, now occurring once or twice weekly, lasting 10-15 min, manifesting as weakness and lightheadedness, perhaps triggered by emotional stress when she thinks about her family.  On the occasion that she felt presyncopal and fell she called EMS.  She converted spontaneously to sinus rhythm in the ambulance.  Strips of the SVT, scanned into our EPIC and stored in the Media section of Chart Review, show a regular, narrow complex tachycardia without clearly identifiable atrial depolarization.  She has been having paroxysms of SVT for the past several months.  She denies chest pain, dyspnea, orthopnea, and peripheral edema.      Current Outpatient Prescriptions on File Prior to Visit:  meclizine (ANTIVERT) 12.5 MG tablet Take 1 tablet (12.5 mg) by mouth 3 times daily as needed for dizziness   ondansetron (ZOFRAN-ODT) 4 MG disintegrating tablet Take 1 tablet (4 mg) by mouth every 6 hours as needed for nausea   pantoprazole (PROTONIX) 20 MG tablet Take by mouth 30-60 minutes before a meal.   albuterol (PROAIR HFA, PROVENTIL HFA, VENTOLIN HFA) 108 (90 BASE) MCG/ACT inhaler Inhale 2 puffs into the lungs every 6 hours   LORAZEPAM PO Take 0.5 mg by mouth every 8 hours as needed for anxiety   MIRTAZAPINE PO Take 45 mg by mouth At Bedtime   Emollient (EUCERIN) LOTN Apply to face/ body twice daily as needed.   cholecalciferol (VITAMIN  "D) 1000 UNIT tablet Take 1,000 Units by mouth   cyanocobalamin 1000 MCG TABS Take 1,000 mcg by mouth   loratadine (CLARITIN) 10 MG tablet Take 10 mg by mouth     No current facility-administered medications on file prior to visit.   No Known Allergies    Past Medical History:   Diagnosis Date     Diabetes (H)      Hypertension      MDD (major depressive disorder)      PTSD (post-traumatic stress disorder)      Past Surgical History:   Procedure Laterality Date     EYE SURGERY       Right Arm Surgery       No family history on file.  Social History     Social History     Marital status:      Spouse name: N/A     Number of children: N/A     Years of education: N/A     Occupational History     Not on file.     Social History Main Topics     Smoking status: Never Smoker     Smokeless tobacco: Not on file     Alcohol use No     Drug use: No     Sexual activity: Not on file     Other Topics Concern     Not on file     Social History Narrative     and kids were killed in civil war.         A complete review of systems is negative except as noted above in the HPI.      Physical Examination:  Vitals: /74 (BP Location: Left arm, Patient Position: Chair, Cuff Size: Adult Regular)  Pulse 77  Ht 1.665 m (5' 5.55\")  Wt 76.4 kg (168 lb 8 oz)  SpO2 98%  BMI 27.57 kg/m2  GENERAL APPEARANCE:  Comfortable appearing female with unlabored breathing sitting comfortably.  HEENT:  Anicteric sclerae. Conjugate gaze.  NECK:  Head wrap/shroud masked JVP.  CHEST:  Lungs are clear to auscultation.  CARDIOVASCULAR:  RRR with normal s1 and s2.  No murmur or rub.  Warm extremities with no peripheral edema.  ABDOMEN: soft, not tender or distended.  NEURO: alert and answers questions appropriately via .  PSYCH:  Pleasant, forthright, cooperative.  Not visibly agitated or anxious.  SKIN: no petechiae/ecchymoses.  Not jaundiced.      Labs, imaging, and procedures were reviewed:  Recent Labs   Lab Test  09/25/17   " 1828  08/25/17   2215  03/03/15   0143   TSH   --    --   2.62   HGB   --   12.3   --    CR  0.74  0.72   --      Echocardiogram on 9/27/17 showed LV EF 55-60%, normal RV function, and no significant valvular disease.  AUSTIN was 21.6 ml/m2 (normal).      Assessment and recommendations:  Ms Francis is a 46 yo female with severely symptomatic paroxysmal SVT once even lightheaded enough to fall and suffer head injury.  We discussed the risks and benefits of medical therapy with AV alessandro blocking agents (non-invasive but not 100% effective and may lower threshold for presyncope when in SVT because of lower BP) vs ablation (risk of vascular injury and bleeding, AV alessandro injury necessitating PPM placement, myocardial rupture and pericardial tamponade, among others).  She elected to pursue ablation and will be scheduled for the same.    She will follow up in clinic following the procedure.  I appreciate the chance to help with Ms Francis care. Please let me know if you have any questions or concerns.    I discussed the patient with Dr. Cuco Meza.    Ricky Brooks MD  Cardiovascular disease fellow    Attending: Patient seen and examined with Dr. Brooks. The history and physical findings are accurate as recorded. My additional findings, if any, have been incorporated into the body of the note. All labs, imaging studies, ECG and telemetry data have been reviewed personally. The assessment and plans outlined reflect our joint decision making.    Cuco Meza MD

## 2017-10-09 NOTE — PROGRESS NOTES
Cardiac Electrophysiology Clinic    Chief Complaint:  Symptomatic SVT    HPI:  Ms Francis is a 46 yo female referred by Dr. Luis Eduardo Callaway for management of symptomatic supraventricular tachycardia, once associated with lightheadedness and a fall resulting in head trauma in 8/2017, now occurring once or twice weekly, lasting 10-15 min, manifesting as weakness and lightheadedness, perhaps triggered by emotional stress when she thinks about her family.  On the occasion that she felt presyncopal and fell she called EMS.  She converted spontaneously to sinus rhythm in the ambulance.  Strips of the SVT, scanned into our EPIC and stored in the Media section of Chart Review, show a regular, narrow complex tachycardia without clearly identifiable atrial depolarization.  She has been having paroxysms of SVT for the past several months.  She denies chest pain, dyspnea, orthopnea, and peripheral edema.      Current Outpatient Prescriptions on File Prior to Visit:  meclizine (ANTIVERT) 12.5 MG tablet Take 1 tablet (12.5 mg) by mouth 3 times daily as needed for dizziness   ondansetron (ZOFRAN-ODT) 4 MG disintegrating tablet Take 1 tablet (4 mg) by mouth every 6 hours as needed for nausea   pantoprazole (PROTONIX) 20 MG tablet Take by mouth 30-60 minutes before a meal.   albuterol (PROAIR HFA, PROVENTIL HFA, VENTOLIN HFA) 108 (90 BASE) MCG/ACT inhaler Inhale 2 puffs into the lungs every 6 hours   LORAZEPAM PO Take 0.5 mg by mouth every 8 hours as needed for anxiety   MIRTAZAPINE PO Take 45 mg by mouth At Bedtime   Emollient (EUCERIN) LOTN Apply to face/ body twice daily as needed.   cholecalciferol (VITAMIN D) 1000 UNIT tablet Take 1,000 Units by mouth   cyanocobalamin 1000 MCG TABS Take 1,000 mcg by mouth   loratadine (CLARITIN) 10 MG tablet Take 10 mg by mouth     No current facility-administered medications on file prior to visit.   No Known Allergies    Past Medical History:   Diagnosis Date     Diabetes (H)       "Hypertension      MDD (major depressive disorder)      PTSD (post-traumatic stress disorder)      Past Surgical History:   Procedure Laterality Date     EYE SURGERY       Right Arm Surgery       No family history on file.  Social History     Social History     Marital status:      Spouse name: N/A     Number of children: N/A     Years of education: N/A     Occupational History     Not on file.     Social History Main Topics     Smoking status: Never Smoker     Smokeless tobacco: Not on file     Alcohol use No     Drug use: No     Sexual activity: Not on file     Other Topics Concern     Not on file     Social History Narrative     and kids were killed in civil war.         A complete review of systems is negative except as noted above in the HPI.      Physical Examination:  Vitals: /74 (BP Location: Left arm, Patient Position: Chair, Cuff Size: Adult Regular)  Pulse 77  Ht 1.665 m (5' 5.55\")  Wt 76.4 kg (168 lb 8 oz)  SpO2 98%  BMI 27.57 kg/m2  GENERAL APPEARANCE:  Comfortable appearing female with unlabored breathing sitting comfortably.  HEENT:  Anicteric sclerae. Conjugate gaze.  NECK:  Head wrap/shroud masked JVP.  CHEST:  Lungs are clear to auscultation.  CARDIOVASCULAR:  RRR with normal s1 and s2.  No murmur or rub.  Warm extremities with no peripheral edema.  ABDOMEN: soft, not tender or distended.  NEURO: alert and answers questions appropriately via .  PSYCH:  Pleasant, forthright, cooperative.  Not visibly agitated or anxious.  SKIN: no petechiae/ecchymoses.  Not jaundiced.      Labs, imaging, and procedures were reviewed:  Recent Labs   Lab Test  09/25/17   1828  08/25/17   2215  03/03/15   0143   TSH   --    --   2.62   HGB   --   12.3   --    CR  0.74  0.72   --      Echocardiogram on 9/27/17 showed LV EF 55-60%, normal RV function, and no significant valvular disease.  AUSTIN was 21.6 ml/m2 (normal).      Assessment and recommendations:  Ms Francis is a 46 yo female " with severely symptomatic paroxysmal SVT once even lightheaded enough to fall and suffer head injury.  We discussed the risks and benefits of medical therapy with AV alessandro blocking agents (non-invasive but not 100% effective and may lower threshold for presyncope when in SVT because of lower BP) vs ablation (risk of vascular injury and bleeding, AV alessandro injury necessitating PPM placement, myocardial rupture and pericardial tamponade, among others).  She elected to pursue ablation and will be scheduled for the same.    She will follow up in clinic following the procedure.  I appreciate the chance to help with Ms Penny gomez. Please let me know if you have any questions or concerns.    I discussed the patient with Dr. Cuco Meza.    Ricky Brooks MD  Cardiovascular disease fellow    Attending: Patient seen and examined with Dr. Brooks. The history and physical findings are accurate as recorded. My additional findings, if any, have been incorporated into the body of the note. All labs, imaging studies, ECG and telemetry data have been reviewed personally. The assessment and plans outlined reflect our joint decision making.    Cuco Meza MD

## 2017-10-09 NOTE — NURSING NOTE
Chief Complaint   Patient presents with     New Patient     Initial consult for SVT; referred by Dr. Callaway     Vitals were taken and medications were reconciled.    Roz Molina CMA     4:31 PM

## 2017-10-09 NOTE — MR AVS SNAPSHOT
After Visit Summary   10/9/2017    Asa Francis    MRN: [id number]           Patient Information     Date Of Birth          1970        Visit Information        Provider Department      10/9/2017 4:15 PM Esha Shah Wayne Owen, MD [facility]        Care Instructions    You are scheduled for an SVT ablation, at [facility] with Dr. Ly. The hospital is located at [address] [place] [address] on the [place] the [place]. The phone number is: [phone].  If you need to cancel this procedure, please call [phone].    Date:__10/26/17____  Time: __7 AM_____To the [facility] Waiting Room at the [facility]  Supraventricular Tachycardia Ablation (SVT)    1. Your history and physical will be completed by our nurse practitioner when you arrive.  2. Please do not eat anything for 8 hours prior to your procedure. You may have sips of water up until 2 hours prior to your arrival.  3. You may stay in the hospital overnight, and will need a .    Please do not hesitate to utilize MONTAJ or call us if you have any questions or concerns.    [facility]ra [facility]ERA Procedure   [phone]                Follow-ups after your visit        Follow-up notes from your care team     Return in about 3 months (around 1/9/2018) for MECCA Meza, Routine Visit.      Your next 10 appointments already scheduled     Jan 29, 2018  3:20 PM CST   (Arrive by 3:05 PM)   RETURN ARRHYTHMIA with Cuco Meza MD   [facility] ([facility])    [address] [place] [id number] [phone]              Who to contact     If you have questions or need follow up information about today's clinic visit or your schedule please contact [facility] directly at [phone].  Normal or non-critical lab and imaging results will be communicated to you by MyChart, letter or phone within 4 business days  "after the clinic has received the results. If you do not hear from us within 7 days, please contact the clinic through Instant AV or phone. If you have a critical or abnormal lab result, we will notify you by phone as soon as possible.  Submit refill requests through Instant AV or call your pharmacy and they will forward the refill request to us. Please allow 3 business days for your refill to be completed.          Additional Information About Your Visit        Instant AV Information     Instant AV lets you send messages to your doctor, view your test results, renew your prescriptions, schedule appointments and more. To sign up, go to [email or web address] . Click on \"Log in\" on the left side of the screen, which will take you to the Welcome page. Then click on \"Sign up Now\" on the right side of the page.     You will be asked to enter the access code listed below, as well as some personal information. Please follow the directions to create your username and password.     Your access code is: [email or web address]  Expires: 2017 12:39 AM     Your access code will  in 90 days. If you need help or a new code, please call your [place] clinic or [phone].        Care EveryWhere ID     This is your Care EveryWhere ID. This could be used by other organizations to access your [place] medical records  [id number]        Your Vitals Were     Pulse Height Pulse Oximetry BMI (Body Mass Index)          77 1.665 m (5' 5.55\") 98% 27.57 kg/m2         Blood Pressure from Last 3 Encounters:   10/09/17 109/74   17 107/71   17 112/75    Weight from Last 3 Encounters:   10/09/17 76.4 kg (168 lb 8 oz)   17 75.1 kg (165 lb 9.6 oz)   03/02/15 86.6 kg (191 lb)              Today, you had the following     No orders found for display       Primary Care Provider Office Phone # Fax #    [facility] [phone]       [address] [place] [address]        Equal Access to Services     " GENEVIEVE GIBBS : Hadii aad ku myke Singleton, waaxda luqadaha, qaybta kaalmada adeviktoriya, evelia mikaelain hayaapasha chengomar luis madison . So [facility] [phone].    ATENCIÓN: Si habla esptere, tiene a salazar disposición servicios gratuitos de asistencia lingüística. Llame al [phone].    We comply with applicable federal civil rights laws and [place] laws. We do not discriminate on the basis of race, color, national origin, age, disability, sex, sexual orientation, or gender identity.            Thank you!     Thank you for choosing [facility]  for your care. Our goal is always to provide you with excellent care. Hearing back from our patients is one way we can continue to improve our services. Please take a few minutes to complete the written survey that you may receive in the mail after your visit with us. Thank you!             Your Updated Medication List - Protect others around you: Learn how to safely use, store and throw away your medicines at www.disposemymeds.org.          This list is accurate as of: 10/9/17  5:35 PM.  Always use your most recent med list.                   Brand Name Dispense Instructions for use Diagnosis    albuterol 108 (90 BASE) MCG/ACT Inhaler    PROAIR HFA/PROVENTIL HFA/VENTOLIN HFA     Inhale 2 puffs into the lungs every 6 hours        cholecalciferol 1000 UNIT tablet    vitamin D     Take 1,000 Units by mouth        cyanocobalamin 1000 MCG Tabs      Take 1,000 mcg by mouth        EUCERIN Lotn      Apply to face/ body twice daily as needed.        loratadine 10 MG tablet    CLARITIN     Take 10 mg by mouth        LORAZEPAM PO      Take 0.5 mg by mouth every 8 hours as needed for anxiety        meclizine 12.5 MG tablet    ANTIVERT    20 tablet    Take 1 tablet (12.5 mg) by mouth 3 times daily as needed for dizziness    Dizziness       MIRTAZAPINE PO      Take 45 mg by mouth At Bedtime        ondansetron 4 MG ODT tab    ZOFRAN-ODT    20 tablet    Take 1 tablet (4 mg) by mouth  every 6 hours as needed for nausea    Nausea       pantoprazole 20 MG EC tablet    PROTONIX    30 tablet    Take by mouth 30-60 minutes before a meal.    Esophageal reflux

## 2017-10-10 ENCOUNTER — CARE COORDINATION (OUTPATIENT)
Dept: CARDIOLOGY | Facility: CLINIC | Age: 47
End: 2017-10-10

## 2017-10-10 DIAGNOSIS — I47.10 PAROXYSMAL SUPRAVENTRICULAR TACHYCARDIA (H): Primary | ICD-10-CM

## 2017-10-10 RX ORDER — LIDOCAINE 40 MG/G
CREAM TOPICAL
Status: CANCELLED | OUTPATIENT
Start: 2017-10-10

## 2017-10-26 ENCOUNTER — APPOINTMENT (OUTPATIENT)
Dept: CARDIOLOGY | Facility: CLINIC | Age: 47
End: 2017-10-26
Attending: INTERNAL MEDICINE
Payer: COMMERCIAL

## 2017-10-26 ENCOUNTER — APPOINTMENT (OUTPATIENT)
Dept: MEDSURG UNIT | Facility: CLINIC | Age: 47
End: 2017-10-26
Attending: INTERNAL MEDICINE
Payer: COMMERCIAL

## 2017-10-26 ENCOUNTER — HOSPITAL ENCOUNTER (OUTPATIENT)
Facility: CLINIC | Age: 47
Discharge: HOME | End: 2017-10-26
Attending: INTERNAL MEDICINE | Admitting: INTERNAL MEDICINE
Payer: COMMERCIAL

## 2017-10-26 VITALS
WEIGHT: 164 LBS | HEIGHT: 66 IN | OXYGEN SATURATION: 97 % | TEMPERATURE: 98.3 F | HEART RATE: 65 BPM | BODY MASS INDEX: 26.36 KG/M2 | SYSTOLIC BLOOD PRESSURE: 108 MMHG | DIASTOLIC BLOOD PRESSURE: 68 MMHG | RESPIRATION RATE: 16 BRPM

## 2017-10-26 DIAGNOSIS — I47.10 PAROXYSMAL SUPRAVENTRICULAR TACHYCARDIA (H): ICD-10-CM

## 2017-10-26 PROBLEM — Z98.890 S/P CATHETER ABLATION OF SLOW PATHWAY: Status: ACTIVE | Noted: 2017-10-26

## 2017-10-26 PROBLEM — Z86.79 S/P CATHETER ABLATION OF SLOW PATHWAY: Status: ACTIVE | Noted: 2017-10-26

## 2017-10-26 LAB
ANION GAP SERPL CALCULATED.3IONS-SCNC: 8 MMOL/L (ref 3–14)
B-HCG SERPL-ACNC: 1 IU/L (ref 0–5)
BUN SERPL-MCNC: 6 MG/DL (ref 7–30)
CALCIUM SERPL-MCNC: 8.9 MG/DL (ref 8.5–10.1)
CHLORIDE SERPL-SCNC: 109 MMOL/L (ref 94–109)
CO2 SERPL-SCNC: 24 MMOL/L (ref 20–32)
CREAT SERPL-MCNC: 0.73 MG/DL (ref 0.52–1.04)
ERYTHROCYTE [DISTWIDTH] IN BLOOD BY AUTOMATED COUNT: 13.7 % (ref 10–15)
GFR SERPL CREATININE-BSD FRML MDRD: 85 ML/MIN/1.7M2
GLUCOSE SERPL-MCNC: 80 MG/DL (ref 70–99)
HCT VFR BLD AUTO: 39.3 % (ref 35–47)
HGB BLD-MCNC: 12.7 G/DL (ref 11.7–15.7)
MCH RBC QN AUTO: 31.9 PG (ref 26.5–33)
MCHC RBC AUTO-ENTMCNC: 32.3 G/DL (ref 31.5–36.5)
MCV RBC AUTO: 99 FL (ref 78–100)
PLATELET # BLD AUTO: 224 10E9/L (ref 150–450)
POTASSIUM SERPL-SCNC: 3.6 MMOL/L (ref 3.4–5.3)
RBC # BLD AUTO: 3.98 10E12/L (ref 3.8–5.2)
SODIUM SERPL-SCNC: 142 MMOL/L (ref 133–144)
WBC # BLD AUTO: 3.3 10E9/L (ref 4–11)

## 2017-10-26 PROCEDURE — 93653 COMPRE EP EVAL TX SVT: CPT | Performed by: INTERNAL MEDICINE

## 2017-10-26 PROCEDURE — 27210946 ZZH KIT HC TOTES DISP CR8

## 2017-10-26 PROCEDURE — 40000172 ZZH STATISTIC PROCEDURE PREP ONLY

## 2017-10-26 PROCEDURE — 27210841 ZZH MANIFOLD CR5

## 2017-10-26 PROCEDURE — C1730 CATH, EP, 19 OR FEW ELECT: HCPCS

## 2017-10-26 PROCEDURE — 93623 PRGRMD STIMJ&PACG IV RX NFS: CPT | Mod: 26 | Performed by: INTERNAL MEDICINE

## 2017-10-26 PROCEDURE — 25000132 ZZH RX MED GY IP 250 OP 250 PS 637: Performed by: NURSE PRACTITIONER

## 2017-10-26 PROCEDURE — 27210795 ZZH PAD DEFIB QUICK CR4

## 2017-10-26 PROCEDURE — 27210797 ZZH PAD EXTRNAL REFRENCE CARDIAC MAPPING CR20

## 2017-10-26 PROCEDURE — 4A023FZ MEASUREMENT OF CARDIAC RHYTHM, PERCUTANEOUS APPROACH: ICD-10-PCS | Performed by: INTERNAL MEDICINE

## 2017-10-26 PROCEDURE — 80048 BASIC METABOLIC PNL TOTAL CA: CPT | Performed by: INTERNAL MEDICINE

## 2017-10-26 PROCEDURE — 27210856 ZZH ACCESS HEART CATH CR2

## 2017-10-26 PROCEDURE — 93621 COMP EP EVL L PAC&REC C SINS: CPT

## 2017-10-26 PROCEDURE — 93653 COMPRE EP EVAL TX SVT: CPT

## 2017-10-26 PROCEDURE — 93613 INTRACARDIAC EPHYS 3D MAPG: CPT | Performed by: INTERNAL MEDICINE

## 2017-10-26 PROCEDURE — C1733 CATH, EP, OTHR THAN COOL-TIP: HCPCS

## 2017-10-26 PROCEDURE — 99152 MOD SED SAME PHYS/QHP 5/>YRS: CPT

## 2017-10-26 PROCEDURE — 4A0234Z MEASUREMENT OF CARDIAC ELECTRICAL ACTIVITY, PERCUTANEOUS APPROACH: ICD-10-PCS | Performed by: INTERNAL MEDICINE

## 2017-10-26 PROCEDURE — 93010 ELECTROCARDIOGRAM REPORT: CPT | Mod: 59 | Performed by: INTERNAL MEDICINE

## 2017-10-26 PROCEDURE — 93005 ELECTROCARDIOGRAM TRACING: CPT

## 2017-10-26 PROCEDURE — 85027 COMPLETE CBC AUTOMATED: CPT | Performed by: INTERNAL MEDICINE

## 2017-10-26 PROCEDURE — 27210807 ZZH SHEATH CR6

## 2017-10-26 PROCEDURE — C1894 INTRO/SHEATH, NON-LASER: HCPCS

## 2017-10-26 PROCEDURE — 25000128 H RX IP 250 OP 636: Performed by: NURSE PRACTITIONER

## 2017-10-26 PROCEDURE — 93623 PRGRMD STIMJ&PACG IV RX NFS: CPT

## 2017-10-26 PROCEDURE — 02573ZZ DESTRUCTION OF LEFT ATRIUM, PERCUTANEOUS APPROACH: ICD-10-PCS | Performed by: INTERNAL MEDICINE

## 2017-10-26 PROCEDURE — 84702 CHORIONIC GONADOTROPIN TEST: CPT | Performed by: INTERNAL MEDICINE

## 2017-10-26 PROCEDURE — 25000125 ZZHC RX 250: Performed by: NURSE PRACTITIONER

## 2017-10-26 PROCEDURE — T1013 SIGN LANG/ORAL INTERPRETER: HCPCS | Mod: U3

## 2017-10-26 PROCEDURE — 93613 INTRACARDIAC EPHYS 3D MAPG: CPT

## 2017-10-26 PROCEDURE — 99153 MOD SED SAME PHYS/QHP EA: CPT

## 2017-10-26 PROCEDURE — 02K83ZZ MAP CONDUCTION MECHANISM, PERCUTANEOUS APPROACH: ICD-10-PCS | Performed by: INTERNAL MEDICINE

## 2017-10-26 RX ORDER — DIPHENHYDRAMINE HYDROCHLORIDE 50 MG/ML
25-50 INJECTION INTRAMUSCULAR; INTRAVENOUS
Status: DISCONTINUED | OUTPATIENT
Start: 2017-10-26 | End: 2017-10-26 | Stop reason: HOSPADM

## 2017-10-26 RX ORDER — FLUMAZENIL 0.1 MG/ML
0.2 INJECTION, SOLUTION INTRAVENOUS
Status: DISCONTINUED | OUTPATIENT
Start: 2017-10-26 | End: 2017-10-26 | Stop reason: HOSPADM

## 2017-10-26 RX ORDER — ADENOSINE 3 MG/ML
6-12 INJECTION, SOLUTION INTRAVENOUS EVERY 5 MIN PRN
Status: DISCONTINUED | OUTPATIENT
Start: 2017-10-26 | End: 2017-10-26 | Stop reason: HOSPADM

## 2017-10-26 RX ORDER — LIDOCAINE 40 MG/G
CREAM TOPICAL
Status: DISCONTINUED | OUTPATIENT
Start: 2017-10-26 | End: 2017-10-26 | Stop reason: HOSPADM

## 2017-10-26 RX ORDER — ATROPINE SULFATE 0.1 MG/ML
.5-1 INJECTION INTRAVENOUS
Status: DISCONTINUED | OUTPATIENT
Start: 2017-10-26 | End: 2017-10-26 | Stop reason: HOSPADM

## 2017-10-26 RX ORDER — DOBUTAMINE HYDROCHLORIDE 200 MG/100ML
5-40 INJECTION INTRAVENOUS CONTINUOUS PRN
Status: DISCONTINUED | OUTPATIENT
Start: 2017-10-26 | End: 2017-10-26 | Stop reason: HOSPADM

## 2017-10-26 RX ORDER — PROMETHAZINE HYDROCHLORIDE 25 MG/ML
6.25-25 INJECTION, SOLUTION INTRAMUSCULAR; INTRAVENOUS EVERY 4 HOURS PRN
Status: DISCONTINUED | OUTPATIENT
Start: 2017-10-26 | End: 2017-10-26 | Stop reason: HOSPADM

## 2017-10-26 RX ORDER — OXYCODONE AND ACETAMINOPHEN 5; 325 MG/1; MG/1
1 TABLET ORAL EVERY 4 HOURS PRN
Status: DISCONTINUED | OUTPATIENT
Start: 2017-10-26 | End: 2017-10-26 | Stop reason: HOSPADM

## 2017-10-26 RX ORDER — NALOXONE HYDROCHLORIDE 0.4 MG/ML
.1-.4 INJECTION, SOLUTION INTRAMUSCULAR; INTRAVENOUS; SUBCUTANEOUS
Status: DISCONTINUED | OUTPATIENT
Start: 2017-10-26 | End: 2017-10-26 | Stop reason: HOSPADM

## 2017-10-26 RX ORDER — LORAZEPAM 2 MG/ML
.5-2 INJECTION INTRAMUSCULAR EVERY 10 MIN PRN
Status: DISCONTINUED | OUTPATIENT
Start: 2017-10-26 | End: 2017-10-26 | Stop reason: HOSPADM

## 2017-10-26 RX ORDER — NALOXONE HYDROCHLORIDE 0.4 MG/ML
0.4 INJECTION, SOLUTION INTRAMUSCULAR; INTRAVENOUS; SUBCUTANEOUS EVERY 5 MIN PRN
Status: DISCONTINUED | OUTPATIENT
Start: 2017-10-26 | End: 2017-10-26 | Stop reason: HOSPADM

## 2017-10-26 RX ORDER — HEPARIN SODIUM 1000 [USP'U]/ML
1000-10000 INJECTION, SOLUTION INTRAVENOUS; SUBCUTANEOUS EVERY 5 MIN PRN
Status: DISCONTINUED | OUTPATIENT
Start: 2017-10-26 | End: 2017-10-26 | Stop reason: HOSPADM

## 2017-10-26 RX ORDER — KETOROLAC TROMETHAMINE 30 MG/ML
15-30 INJECTION, SOLUTION INTRAMUSCULAR; INTRAVENOUS
Status: DISCONTINUED | OUTPATIENT
Start: 2017-10-26 | End: 2017-10-26 | Stop reason: HOSPADM

## 2017-10-26 RX ORDER — FENTANYL CITRATE 50 UG/ML
25-50 INJECTION, SOLUTION INTRAMUSCULAR; INTRAVENOUS
Status: DISCONTINUED | OUTPATIENT
Start: 2017-10-26 | End: 2017-10-26 | Stop reason: HOSPADM

## 2017-10-26 RX ORDER — FUROSEMIDE 10 MG/ML
20-100 INJECTION INTRAMUSCULAR; INTRAVENOUS
Status: DISCONTINUED | OUTPATIENT
Start: 2017-10-26 | End: 2017-10-26 | Stop reason: HOSPADM

## 2017-10-26 RX ORDER — PROTAMINE SULFATE 10 MG/ML
5-40 INJECTION, SOLUTION INTRAVENOUS EVERY 10 MIN PRN
Status: DISCONTINUED | OUTPATIENT
Start: 2017-10-26 | End: 2017-10-26 | Stop reason: HOSPADM

## 2017-10-26 RX ORDER — ONDANSETRON 2 MG/ML
4 INJECTION INTRAMUSCULAR; INTRAVENOUS EVERY 4 HOURS PRN
Status: DISCONTINUED | OUTPATIENT
Start: 2017-10-26 | End: 2017-10-26 | Stop reason: HOSPADM

## 2017-10-26 RX ORDER — PROTAMINE SULFATE 10 MG/ML
1-5 INJECTION, SOLUTION INTRAVENOUS
Status: DISCONTINUED | OUTPATIENT
Start: 2017-10-26 | End: 2017-10-26 | Stop reason: HOSPADM

## 2017-10-26 RX ADMIN — MIDAZOLAM HYDROCHLORIDE 0.5 MG: 1 INJECTION, SOLUTION INTRAMUSCULAR; INTRAVENOUS at 10:53

## 2017-10-26 RX ADMIN — MIDAZOLAM HYDROCHLORIDE 0.5 MG: 1 INJECTION, SOLUTION INTRAMUSCULAR; INTRAVENOUS at 12:05

## 2017-10-26 RX ADMIN — Medication 3 MCG/MIN: at 13:28

## 2017-10-26 RX ADMIN — MIDAZOLAM HYDROCHLORIDE 0.5 MG: 1 INJECTION, SOLUTION INTRAMUSCULAR; INTRAVENOUS at 12:42

## 2017-10-26 RX ADMIN — FENTANYL CITRATE 50 MCG: 50 INJECTION, SOLUTION INTRAMUSCULAR; INTRAVENOUS at 13:54

## 2017-10-26 RX ADMIN — MIDAZOLAM HYDROCHLORIDE 0.5 MG: 1 INJECTION, SOLUTION INTRAMUSCULAR; INTRAVENOUS at 13:24

## 2017-10-26 RX ADMIN — MIDAZOLAM HYDROCHLORIDE 0.5 MG: 1 INJECTION, SOLUTION INTRAMUSCULAR; INTRAVENOUS at 12:15

## 2017-10-26 RX ADMIN — FENTANYL CITRATE 50 MCG/HR: 50 INJECTION INTRAVENOUS at 10:47

## 2017-10-26 RX ADMIN — MIDAZOLAM 1 MG/HR: 5 INJECTION INTRAMUSCULAR; INTRAVENOUS at 10:47

## 2017-10-26 RX ADMIN — MIDAZOLAM HYDROCHLORIDE 1 MG: 1 INJECTION, SOLUTION INTRAMUSCULAR; INTRAVENOUS at 10:47

## 2017-10-26 RX ADMIN — OXYCODONE HYDROCHLORIDE AND ACETAMINOPHEN 1 TABLET: 5; 325 TABLET ORAL at 15:55

## 2017-10-26 RX ADMIN — Medication 1 MCG/MIN: at 11:38

## 2017-10-26 RX ADMIN — FENTANYL CITRATE 50 MCG: 50 INJECTION, SOLUTION INTRAMUSCULAR; INTRAVENOUS at 10:47

## 2017-10-26 RX ADMIN — FENTANYL CITRATE 25 MCG: 50 INJECTION, SOLUTION INTRAMUSCULAR; INTRAVENOUS at 10:52

## 2017-10-26 RX ADMIN — FENTANYL CITRATE 25 MCG: 50 INJECTION, SOLUTION INTRAMUSCULAR; INTRAVENOUS at 12:25

## 2017-10-26 RX ADMIN — FENTANYL CITRATE 25 MCG: 50 INJECTION, SOLUTION INTRAMUSCULAR; INTRAVENOUS at 13:24

## 2017-10-26 RX ADMIN — FENTANYL CITRATE 25 MCG: 50 INJECTION, SOLUTION INTRAMUSCULAR; INTRAVENOUS at 12:05

## 2017-10-26 RX ADMIN — MIDAZOLAM HYDROCHLORIDE 0.5 MG: 1 INJECTION, SOLUTION INTRAMUSCULAR; INTRAVENOUS at 11:03

## 2017-10-26 ASSESSMENT — PAIN DESCRIPTION - DESCRIPTORS: DESCRIPTORS: PATIENT UNABLE TO DESCRIBE

## 2017-10-26 NOTE — PROGRESS NOTES
Returned from [facility] post ablation procedure.   present.  Denies pain.  Right groin site C/D/I, right pedal pulse 2+.  Sleepy, so will wait & offer food when more awake.  Taking sips water without difficulty.

## 2017-10-26 NOTE — PROGRESS NOTES
EP PROCEDURE NOTE    Procedures:  1. AVN modification with slow pathway ablation.  2. Comprehensive EP study with conventional mapping and pacing from RA, RV and CS with His recording.  3. Isuprel infusion.  4. 3D Mapping using THU    Cardiologist: Cuco Meza MD    Procedure Date: 10/26/2017    Pre-operative Diagnosis:  Symptomatic SVT.  Post-operative diagnosis:  AVNRT - Typical Slow-fast  Complications: None.  Fluoroscopy time/dose: 22.3 minutes, 426 cGycm2.      Clinical Profile:  47 year-old with document, highly symptomatic supraventricular tachycardia. Consent obtained with help of  who remained throughout the case.     PROCEDURE  The risks and benefits of the procedure were explained to the patient in full.  The risks include, but are not limited to: pain, bleeding, blood transfusion reactions, life threatening arrhythmia, dissection of vessels, cardiac perforation, pericardial effusion, need for permanent pacemaker implantation due to AV block, and death. Informed Consent was obtained. The patient was brought to the EP lab in a fasting and hemodynamically stable condition. The patient was prepped and draped in a sterile fashion.   Sedation: This procedure was performed under moderate sedation under the supervision of the the Staff Physician. The patient received 7 mg Versed and 359 mcg Fentanyl for a total procedural sedation time of 200 minutes. Heart rate, blood pressure, oxygen saturation, and patient responses were monitored throughout the procedure with the assistance of the RN.     Sheaths and Catheters:  After local anesthesia with 2% lidocaine, vascular access was obtained using the modified Seldinger technique for the following access points:     Right Femoral Vein:  - 7Fr Locking Sheath: A decapolar Catheter placed into the Coronary Sinus.  - 6Fr Locking sheath: A CRD2 catheter was placed into the His position.  - 8Fr sheath:  through which an 6 Fr Adele catheter was placed,  initially in the RV and later in the RA.  EPT Blazer II 4mm Tip standard curve mapping and ablation catheter was positioned into the RA and RV. This was exchanged for a large curve 4mm tip catheter.    EP study results (in milliseconds):  The ECG was reported by the original consult team as showing an SVT at 200 bpm and no discernable P waves.. Examination of the patient's 12 lead EKG revealed no evidence of a manifest pre-excitation.   Pre-ablation: AA= VV= 976, WA= 180, QRS= 107, QT= 433, AH= 112, HV= 43.  AVBCL= 430, VABCL= >700 msec.  AVERP= 600/340 and 500/310, AERP= 600/260. There was no discrete AH jump seen.  VA activation was concentric.      Isoproterenol was begun. On 1mcg/min single echoes noted but no jump. On 3 mcg sustained SVT induced,  msec with simultaneous VA time.    Post-ablation: WA= 174, AH= 98, HV= 40     Arrhythmias Observed or Induced:  The SVT was induced with pacing from HRA with isuprel up to 3 mcg/min using two extra-stimuli at a pacing CL of 400 msec. The observed arrhythmias were:  A. AVNRT - Typical Slow-fast    Diagnostic Maneuvers:  - His refractory PVC: no advancement  - Entrainment form RVa: VAV response       Mapping and Ablation:  The CS catheter was removed. The Adele was placed in the RV and a blazer EPT 4mm tip standard curve catheter was advanced into the RA. 3D voltage mapping of the septum, CS ostium and medial tricuspid caval region was performed. Activation mapping was also performed, which identified an area of convergence that matched the area of low voltage.  The catheter was positioned in the HUNT view below the His location, anterior to the CS ostium.  It was withdrawn till there was an A:V ratio ~ 1:3 to 1:5.  In the Indonesian projection, the catheter was positioned just outside of the CS ostium, at approximately 5:30 position.  No His signals were detected on the ablation catheter.  Several RFA lesions were applied to this region, which resulted in several  "junctional beats.  No lingering AV block was seen.     After the ablation, we then re-tested the patient.  Pacing from HRA/CSp down to block did not induce any tachycardias.  Isuprel was then used up to 4 mcg/min and pacing down to block, pacing at 400 msec with two premature extrastimuli did not induce any tachycardia.  Isuprel was then shut off, and pacing was done during the \"wash out\" phase without any tachyarrhythmias induced.     The procedure was then terminated.  The catheters were removed, and the sheaths pulled with manual pressure applied.  The patient was then transferred back to a monitored bed.         ASSESSMENT / PLAN:  Successful AVNRT ablation with modification of the slow pathway.    1. Ambulate in 4 hours  2. D/C home after ambulation    Patient instructions reviewed via . Family friend update by phone at patient's request.    "

## 2017-10-26 NOTE — IP AVS SNAPSHOT
MRN:[id number]                      After Visit Summary   10/26/2017    Asa Francis    MRN: [id number]           Thank you!     Thank you for choosing [facility] for your care. Our goal is always to provide you with excellent care. Hearing back from our patients is one way we can continue to improve our services. Please take a few minutes to complete the written survey that you may receive in the mail after you visit with us. Thank you!        Patient Information     Date Of Birth          1970        About your hospital stay     You were admitted on:  October 26, 2017 You last received care in the:  Unit 6D Observation [facility]    You were discharged on:  October 26, 2017       Who to Call     For medical emergencies, please call 911.  For non-urgent questions about your medical care, please call your primary care provider or clinic, [phone]          Attending Provider     Provider Specialty    Cuco Meza MD Clinical Cardiac Electrophysiology       Primary Care Provider Office Phone # Fax #    [facility] [phone]      Your next 10 appointments already scheduled     Jan 29, 2018  3:20 PM CST   (Arrive by 3:05 PM)   RETURN ARRHYTHMIA with Cuco Meza MD   [facility] ([facility])    [address] [place] [address] [phone]              Further instructions from your care team           Care of groin site:         Remove the Band-Aid after 24 hours. If there is minor oozing, apply another Band-aid and remove it after 12 hours.          Do NOT take a bath, use a hot tub, pool, or submerse in water for at least 3 days You may shower.          It is normal to have a small bruise or lump at the site.         Do not scrub the site.         Do not use lotion or powder near the puncture site for 3 days.         For the first 2 days: Do not stoop or squat. When you  "cough, sneeze or move your bowels, hold your hand over the puncture site and press gently.         Do not lift more than 10 pounds or exertional activity for 10 days.      If you start bleeding from the site in your groin:  Lie down flat and press firmly on the site.  Call your physician immediately, or, come to the emergency room.    Call 911 right away if you have bleeding that is heavy or does not stop.     Call your doctor/provider if:         You have a large or growing hard lump around the site.         The site is red, swollen, hot or tender.         Blood or fluid is draining from the site.         You have chills or a fever greater than 101 F (38 C).         Your leg or arm turns bluish, feels numb or cool.         You have hives, a rash or unusual itching.     Cardiovascular Clinic:   [address] [place] [address]. [place] [address]  Your Care Team:  EP Cardiology   Telephone Number     Mildred Meza [phone]   Maryann Heaton RN  [phone]     For scheduling appts or procedures:    [facility]   [phone]     As always, Thank you for trusting us with your health care needs          Pending Results     Date and Time Order Name Status Description    10/26/2017 1455 EKG 12-lead, tracing only Preliminary     10/26/2017 0744 EKG 12-lead, tracing only Preliminary             Admission Information     Date & Time Provider Department Dept. Phone    10/26/2017 Cuco Meza MD Unit 6D Observation [facility] [place] [phone]      Your Vitals Were     Blood Pressure Pulse Temperature Respirations Height Weight    108/68 65 98.3  F (36.8  C) (Oral) 16 1.664 m (5' 5.5\") 74.4 kg (164 lb)    Pulse Oximetry BMI (Body Mass Index)                97% 26.88 kg/m2          CHARMS PPEC Information     CHARMS [employer]PEC lets you send messages to your doctor, view your test results, renew your prescriptions, schedule appointments and more. To sign up, go to [email or web address] . Click " "on \"Log in\" on the left side of the screen, which will take you to the Welcome page. Then click on \"Sign up Now\" on the right side of the page.     You will be asked to enter the access code listed below, as well as some personal information. Please follow the directions to create your username and password.     Your access code is: [email or web address]  Expires: 2017 12:39 AM     Your access code will  in 90 days. If you need help or a new code, please call your [facility] clinic or [phone].        Care EveryWhere ID     This is your Care EveryWhere ID. This could be used by other organizations to access your [facility] medical records  [id number]        Equal Access to Services     GENEVIEVE GIBBS : Cyndi Singleton, wacornelius kaur, marco barbozaalanila beltre, evelia pedersen. So [facility] [phone].    ATENCIÓN: Si habla español, tiene a salazar disposición servicios gratuitos de asistencia lingüística. Llame al [phone].    We comply with applicable federal civil rights laws and [place] laws. We do not discriminate on the basis of race, color, national origin, age, disability, sex, sexual orientation, or gender identity.               Review of your medicines      UNREVIEWED medicines. Ask your doctor about these medicines        Dose / Directions    albuterol 108 (90 BASE) MCG/ACT Inhaler   Commonly known as:  PROAIR HFA/PROVENTIL HFA/VENTOLIN HFA        Dose:  2 puff   Inhale 2 puffs into the lungs every 6 hours   Refills:  0       cholecalciferol 1000 UNIT tablet   Commonly known as:  vitamin D3        Dose:  1000 Units   Take 1,000 Units by mouth   Refills:  0       cyanocobalamin 1000 MCG Tabs        Dose:  1000 mcg   Take 1,000 mcg by mouth   Refills:  0       EUCERIN Lotn        Apply to face/ body twice daily as needed.   Refills:  0       loratadine 10 MG tablet   Commonly known as:  CLARITIN        Dose:  10 mg   Take 10 mg by mouth   Refills:  0       LORAZEPAM " PO        Dose:  0.5 mg   Take 0.5 mg by mouth every 8 hours as needed for anxiety   Refills:  0       meclizine 12.5 MG tablet   Commonly known as:  ANTIVERT   Used for:  Dizziness        Dose:  12.5 mg   Take 1 tablet (12.5 mg) by mouth 3 times daily as needed for dizziness   Quantity:  20 tablet   Refills:  0       MIRTAZAPINE PO        Dose:  45 mg   Take 45 mg by mouth At Bedtime   Refills:  0       ondansetron 4 MG ODT tab   Commonly known as:  ZOFRAN-ODT   Used for:  Nausea        Dose:  4 mg   Take 1 tablet (4 mg) by mouth every 6 hours as needed for nausea   Quantity:  20 tablet   Refills:  0       pantoprazole 20 MG EC tablet   Commonly known as:  PROTONIX   Used for:  Esophageal reflux        Take by mouth 30-60 minutes before a meal.   Quantity:  30 tablet   Refills:  0                Protect others around you: Learn how to safely use, store and throw away your medicines at www.disposemymeds.org.             Medication List: This is a list of all your medications and when to take them. Check marks below indicate your daily home schedule. Keep this list as a reference.      Medications           Morning Afternoon Evening Bedtime As Needed    albuterol 108 (90 BASE) MCG/ACT Inhaler   Commonly known as:  PROAIR HFA/PROVENTIL HFA/VENTOLIN HFA   Inhale 2 puffs into the lungs every 6 hours                                cholecalciferol 1000 UNIT tablet   Commonly known as:  vitamin D3   Take 1,000 Units by mouth                                cyanocobalamin 1000 MCG Tabs   Take 1,000 mcg by mouth                                EUCERIN Lotn   Apply to face/ body twice daily as needed.                                loratadine 10 MG tablet   Commonly known as:  CLARITIN   Take 10 mg by mouth                                LORAZEPAM PO   Take 0.5 mg by mouth every 8 hours as needed for anxiety                                meclizine 12.5 MG tablet   Commonly known as:  ANTIVERT   Take 1 tablet (12.5 mg) by mouth  3 times daily as needed for dizziness                                MIRTAZAPINE PO   Take 45 mg by mouth At Bedtime                                ondansetron 4 MG ODT tab   Commonly known as:  ZOFRAN-ODT   Take 1 tablet (4 mg) by mouth every 6 hours as needed for nausea                                pantoprazole 20 MG EC tablet   Commonly known as:  PROTONIX   Take by mouth 30-60 minutes before a meal.

## 2017-10-26 NOTE — H&P
H&P from Dr. Meza's Office Visit on 10/9/17 reviewed. Following today's exam there are no interval changes.   KEYON Cam CNP  Electrophysiology Consult Service  Pager: [phone]

## 2017-10-26 NOTE — PROGRESS NOTES
Pre-procedure note:    Patient seen, no change in exam, no further supraventricular tachycardia episodes.    Consent obtained via .    Labs and ECG not yet completed.

## 2017-10-26 NOTE — IP AVS SNAPSHOT
Unit 6D Observation [address] [address]    Phone:  [phone]    Fax:  [phone]                                       After Visit Summary   10/26/2017    Asa Francis    MRN: [id number]           After Visit Summary Signature Page     I have received my discharge instructions, and my questions have been answered. I have discussed any challenges I see with this plan with the nurse or doctor.    ..........................................................................................................................................  Patient/Patient Representative Signature      ..........................................................................................................................................  Patient Representative Print Name and Relationship to Patient    ..................................................               ................................................  Date                                            Time    ..........................................................................................................................................  Reviewed by Signature/Title    ...................................................              ..............................................  Date                                                            Time

## 2017-10-26 NOTE — DISCHARGE INSTRUCTIONS
Care of groin site:         Remove the Band-Aid after 24 hours. If there is minor oozing, apply another Band-aid and remove it after 12 hours.          Do NOT take a bath, use a hot tub, pool, or submerse in water for at least 3 days You may shower.          It is normal to have a small bruise or lump at the site.         Do not scrub the site.         Do not use lotion or powder near the puncture site for 3 days.         For the first 2 days: Do not stoop or squat. When you cough, sneeze or move your bowels, hold your hand over the puncture site and press gently.         Do not lift more than 10 pounds or exertional activity for 10 days.      If you start bleeding from the site in your groin:  Lie down flat and press firmly on the site.  Call your physician immediately, or, come to the emergency room.    Call 911 right away if you have bleeding that is heavy or does not stop.     Call your doctor/provider if:         You have a large or growing hard lump around the site.         The site is red, swollen, hot or tender.         Blood or fluid is draining from the site.         You have chills or a fever greater than 101 F (38 C).         Your leg or arm turns bluish, feels numb or cool.         You have hives, a rash or unusual itching.     Cardiovascular Clinic:   [address] [place] [address]. [place] [address]  Your Care Team:  EP Cardiology   Telephone Number     Mildred Meza [phone]   Maryann Heaton RN  [phone]     For scheduling appts or procedures:    [facility] Vicente   [phone]     As always, Thank you for trusting us with your health care needs

## 2017-10-30 LAB
INTERPRETATION ECG - MUSE: NORMAL
INTERPRETATION ECG - MUSE: NORMAL

## 2018-01-08 ENCOUNTER — HOSPITAL ENCOUNTER (OUTPATIENT)
Facility: CLINIC | Age: 48
Setting detail: OBSERVATION
Discharge: HOME | End: 2018-01-09
Attending: EMERGENCY MEDICINE | Admitting: INTERNAL MEDICINE
Payer: COMMERCIAL

## 2018-01-08 ENCOUNTER — APPOINTMENT (OUTPATIENT)
Dept: GENERAL RADIOLOGY | Facility: CLINIC | Age: 48
End: 2018-01-08
Attending: EMERGENCY MEDICINE
Payer: COMMERCIAL

## 2018-01-08 DIAGNOSIS — R07.9 ACUTE CHEST PAIN: ICD-10-CM

## 2018-01-08 LAB
ANION GAP SERPL CALCULATED.3IONS-SCNC: 6 MMOL/L (ref 3–14)
BASOPHILS # BLD AUTO: 0 10E9/L (ref 0–0.2)
BASOPHILS NFR BLD AUTO: 0.6 %
BUN SERPL-MCNC: 6 MG/DL (ref 7–30)
CALCIUM SERPL-MCNC: 8.8 MG/DL (ref 8.5–10.1)
CHLORIDE SERPL-SCNC: 108 MMOL/L (ref 94–109)
CO2 SERPL-SCNC: 26 MMOL/L (ref 20–32)
CREAT SERPL-MCNC: 0.65 MG/DL (ref 0.52–1.04)
DIFFERENTIAL METHOD BLD: NORMAL
EOSINOPHIL # BLD AUTO: 0.2 10E9/L (ref 0–0.7)
EOSINOPHIL NFR BLD AUTO: 3.2 %
ERYTHROCYTE [DISTWIDTH] IN BLOOD BY AUTOMATED COUNT: 12.4 % (ref 10–15)
GFR SERPL CREATININE-BSD FRML MDRD: >90 ML/MIN/1.7M2
GLUCOSE SERPL-MCNC: 97 MG/DL (ref 70–99)
HCT VFR BLD AUTO: 38.3 % (ref 35–47)
HGB BLD-MCNC: 12.4 G/DL (ref 11.7–15.7)
IMM GRANULOCYTES # BLD: 0 10E9/L (ref 0–0.4)
IMM GRANULOCYTES NFR BLD: 0.2 %
LYMPHOCYTES # BLD AUTO: 2.6 10E9/L (ref 0.8–5.3)
LYMPHOCYTES NFR BLD AUTO: 55.2 %
MCH RBC QN AUTO: 31.2 PG (ref 26.5–33)
MCHC RBC AUTO-ENTMCNC: 32.4 G/DL (ref 31.5–36.5)
MCV RBC AUTO: 97 FL (ref 78–100)
MONOCYTES # BLD AUTO: 0.3 10E9/L (ref 0–1.3)
MONOCYTES NFR BLD AUTO: 7.1 %
NEUTROPHILS # BLD AUTO: 1.6 10E9/L (ref 1.6–8.3)
NEUTROPHILS NFR BLD AUTO: 33.7 %
NRBC # BLD AUTO: 0 10*3/UL
NRBC BLD AUTO-RTO: 0 /100
PLATELET # BLD AUTO: 208 10E9/L (ref 150–450)
POTASSIUM SERPL-SCNC: 3.5 MMOL/L (ref 3.4–5.3)
RBC # BLD AUTO: 3.97 10E12/L (ref 3.8–5.2)
SODIUM SERPL-SCNC: 140 MMOL/L (ref 133–144)
TROPONIN I SERPL-MCNC: <0.015 UG/L (ref 0–0.04)
WBC # BLD AUTO: 4.7 10E9/L (ref 4–11)

## 2018-01-08 PROCEDURE — 93010 ELECTROCARDIOGRAM REPORT: CPT | Mod: Z6 | Performed by: EMERGENCY MEDICINE

## 2018-01-08 PROCEDURE — 25000132 ZZH RX MED GY IP 250 OP 250 PS 637: Performed by: EMERGENCY MEDICINE

## 2018-01-08 PROCEDURE — 99285 EMERGENCY DEPT VISIT HI MDM: CPT | Mod: 25 | Performed by: EMERGENCY MEDICINE

## 2018-01-08 PROCEDURE — 84484 ASSAY OF TROPONIN QUANT: CPT | Performed by: EMERGENCY MEDICINE

## 2018-01-08 PROCEDURE — 25000125 ZZHC RX 250: Performed by: EMERGENCY MEDICINE

## 2018-01-08 PROCEDURE — 71046 X-RAY EXAM CHEST 2 VIEWS: CPT

## 2018-01-08 PROCEDURE — 80048 BASIC METABOLIC PNL TOTAL CA: CPT | Performed by: EMERGENCY MEDICINE

## 2018-01-08 PROCEDURE — 85025 COMPLETE CBC W/AUTO DIFF WBC: CPT | Performed by: EMERGENCY MEDICINE

## 2018-01-08 PROCEDURE — 93005 ELECTROCARDIOGRAM TRACING: CPT | Performed by: EMERGENCY MEDICINE

## 2018-01-08 RX ADMIN — LIDOCAINE HYDROCHLORIDE 30 ML: 20 SOLUTION ORAL; TOPICAL at 23:48

## 2018-01-08 NOTE — IP AVS SNAPSHOT
MRN:[id number]                      After Visit Summary   1/8/2018    Asa Francis    MRN: [id number]           Thank you!     Thank you for choosing [facility] for your care. Our goal is always to provide you with excellent care. Hearing back from our patients is one way we can continue to improve our services. Please take a few minutes to complete the written survey that you may receive in the mail after you visit with us. Thank you!        Patient Information     Date Of Birth          1970        Designated Caregiver       Most Recent Value    Caregiver    Will someone help with your care after discharge? no      About your hospital stay     You were admitted on:  January 9, 2018 You last received care in the:  Unit 6D Observation [facility]    You were discharged on:  January 9, 2018        Reason for your hospital stay       You were hospitalized due to chest pain symptoms. Your cardiac enzymes were reassuring and not elevated. Your stress test was low risk, no evidence or concern for heart artery blockage. Please follow up with primary care to review non-cardiac causes of chest discomfort.                  Who to Call     For medical emergencies, please call 911.  For non-urgent questions about your medical care, please call your primary care provider or clinic, [phone]          Attending Provider     Provider Specialty    Kanu Hawley MD Emergency Medicine    Liliana Arrington MD Internal Medicine       Primary Care Provider Office Phone # Fax #    [facility] [phone]       When to contact your care team       Call your primary doctor if you have any of the following:  increased shortness of breath or increased chest pain.                  After Care Instructions     Activity       Your activity upon discharge: activity as tolerated            Diet       Follow this diet upon discharge: Regular Diet                  Follow-up  "Appointments     Follow Up and recommended labs and tests       Follow up with primary care provider, [facility], within 7 days for hospital follow up.                  Your next 10 appointments already scheduled     2018  3:20 PM CST   (Arrive by 3:05 PM)   RETURN ARRHYTHMIA with Cuco Meza MD   [facility] ([facility] [place])    [address] [place] [address]  [address] [place] [address] [phone]              Pending Results     No orders found for last 3 day(s).            Statement of Approval     Ordered          18 1404  I have reviewed and agree with all the recommendations and orders detailed in this document.  EFFECTIVE NOW     Approved and electronically signed by:  Luda Adams PA-C             Admission Information     Date & Time Provider Department Dept. Phone    2018 Liliana Arrington MD Unit 6D Observation [facility] [place] [phone]      Your Vitals Were     Blood Pressure Pulse Temperature Respirations Weight Pulse Oximetry    99/68 (BP Location: Right arm) 71 98.1  F (36.7  C) (Oral) 16 72.3 kg (159 lb 8 oz) 98%    BMI (Body Mass Index)                   26.14 kg/m2           MyChart Information     [employer] lets you send messages to your doctor, view your test results, renew your prescriptions, schedule appointments and more. To sign up, go to [email or web address] . Click on \"Log in\" on the left side of the screen, which will take you to the Welcome page. Then click on \"Sign up Now\" on the right side of the page.     You will be asked to enter the access code listed below, as well as some personal information. Please follow the directions to create your username and password.     Your access code is: [email or web address]  Expires: 2018  2:12 PM     Your access code will  in 90 days. If you need help or a new code, please call your [facility] clinic or [phone].        Care EveryWhere ID     This is your " Care EveryWhere ID. This could be used by other organizations to access your [facility] medical records  [id number]        Equal Access to Services     GENEVIEVE GIBBS : Hadii raza Singleton, saeid kaur, reynaldophyllis barbozamunirasukumar mansfieldjuliasukumar, waxjohanna bertin edgarpasha chengomar sadieariraya pedersen. So [facility] [phone].    ATENCIÓN: Si habla español, tiene a salazar disposición servicios gratuitos de asistencia lingüística. Llame al [phone].    We comply with applicable federal civil rights laws and [place] laws. We do not discriminate on the basis of race, color, national origin, age, disability, sex, sexual orientation, or gender identity.               Review of your medicines      CONTINUE these medicines which have NOT CHANGED        Dose / Directions    albuterol 108 (90 BASE) MCG/ACT Inhaler   Commonly known as:  PROAIR HFA/PROVENTIL HFA/VENTOLIN HFA        Dose:  2 puff   Inhale 2 puffs into the lungs every 6 hours   Refills:  0       cholecalciferol 1000 UNIT tablet   Commonly known as:  vitamin D3        Dose:  1000 Units   Take 1,000 Units by mouth   Refills:  0       cyanocobalamin 1000 MCG Tabs        Dose:  1000 mcg   Take 1,000 mcg by mouth   Refills:  0       EUCERIN Lotn        Apply to face/ body twice daily as needed.   Refills:  0       loratadine 10 MG tablet   Commonly known as:  CLARITIN        Dose:  10 mg   Take 10 mg by mouth   Refills:  0       LORAZEPAM PO        Dose:  0.5 mg   Take 0.5 mg by mouth every 8 hours as needed for anxiety   Refills:  0       meclizine 12.5 MG tablet   Commonly known as:  ANTIVERT   Used for:  Dizziness        Dose:  12.5 mg   Take 1 tablet (12.5 mg) by mouth 3 times daily as needed for dizziness   Quantity:  20 tablet   Refills:  0       MIRTAZAPINE PO        Dose:  45 mg   Take 45 mg by mouth At Bedtime   Refills:  0       ondansetron 4 MG ODT tab   Commonly known as:  ZOFRAN-ODT   Used for:  Nausea        Dose:  4 mg   Take 1 tablet (4 mg) by mouth every 6 hours as  needed for nausea   Quantity:  20 tablet   Refills:  0       pantoprazole 20 MG EC tablet   Commonly known as:  PROTONIX   Used for:  Esophageal reflux        Take by mouth 30-60 minutes before a meal.   Quantity:  30 tablet   Refills:  0                Protect others around you: Learn how to safely use, store and throw away your medicines at www.disposemymeds.org.             Medication List: This is a list of all your medications and when to take them. Check marks below indicate your daily home schedule. Keep this list as a reference.      Medications           Morning Afternoon Evening Bedtime As Needed    albuterol 108 (90 BASE) MCG/ACT Inhaler   Commonly known as:  PROAIR HFA/PROVENTIL HFA/VENTOLIN HFA   Inhale 2 puffs into the lungs every 6 hours                                cholecalciferol 1000 UNIT tablet   Commonly known as:  vitamin D3   Take 1,000 Units by mouth                                cyanocobalamin 1000 MCG Tabs   Take 1,000 mcg by mouth                                EUCERIN Lotn   Apply to face/ body twice daily as needed.                                loratadine 10 MG tablet   Commonly known as:  CLARITIN   Take 10 mg by mouth                                LORAZEPAM PO   Take 0.5 mg by mouth every 8 hours as needed for anxiety                                meclizine 12.5 MG tablet   Commonly known as:  ANTIVERT   Take 1 tablet (12.5 mg) by mouth 3 times daily as needed for dizziness                                MIRTAZAPINE PO   Take 45 mg by mouth At Bedtime                                ondansetron 4 MG ODT tab   Commonly known as:  ZOFRAN-ODT   Take 1 tablet (4 mg) by mouth every 6 hours as needed for nausea                                pantoprazole 20 MG EC tablet   Commonly known as:  PROTONIX   Take by mouth 30-60 minutes before a meal.   Last time this was given:  40 mg on 1/9/2018  8:02 AM

## 2018-01-08 NOTE — IP AVS SNAPSHOT
Unit 6D Observation [address] [address]    Phone:  [phone]    Fax:  [phone]                                       After Visit Summary   1/8/2018    Asa Francis    MRN: [id number]           After Visit Summary Signature Page     I have received my discharge instructions, and my questions have been answered. I have discussed any challenges I see with this plan with the nurse or doctor.    ..........................................................................................................................................  Patient/Patient Representative Signature      ..........................................................................................................................................  Patient Representative Print Name and Relationship to Patient    ..................................................               ................................................  Date                                            Time    ..........................................................................................................................................  Reviewed by Signature/Title    ...................................................              ..............................................  Date                                                            Time

## 2018-01-09 ENCOUNTER — APPOINTMENT (OUTPATIENT)
Dept: CARDIOLOGY | Facility: CLINIC | Age: 48
End: 2018-01-09
Payer: COMMERCIAL

## 2018-01-09 VITALS
WEIGHT: 159.5 LBS | OXYGEN SATURATION: 98 % | TEMPERATURE: 98.1 F | RESPIRATION RATE: 16 BRPM | SYSTOLIC BLOOD PRESSURE: 99 MMHG | HEART RATE: 71 BPM | BODY MASS INDEX: 26.14 KG/M2 | DIASTOLIC BLOOD PRESSURE: 68 MMHG

## 2018-01-09 LAB
INTERPRETATION ECG - MUSE: NORMAL
MAGNESIUM SERPL-MCNC: 2 MG/DL (ref 1.6–2.3)
PHOSPHATE SERPL-MCNC: 4.4 MG/DL (ref 2.5–4.5)
TROPONIN I SERPL-MCNC: <0.015 UG/L (ref 0–0.04)
TROPONIN I SERPL-MCNC: <0.015 UG/L (ref 0–0.04)
TSH SERPL DL<=0.005 MIU/L-ACNC: 3.71 MU/L (ref 0.4–4)

## 2018-01-09 PROCEDURE — 93321 DOPPLER ECHO F-UP/LMTD STD: CPT | Mod: TC

## 2018-01-09 PROCEDURE — 96375 TX/PRO/DX INJ NEW DRUG ADDON: CPT

## 2018-01-09 PROCEDURE — 84100 ASSAY OF PHOSPHORUS: CPT | Performed by: PHYSICIAN ASSISTANT

## 2018-01-09 PROCEDURE — 96374 THER/PROPH/DIAG INJ IV PUSH: CPT

## 2018-01-09 PROCEDURE — 93321 DOPPLER ECHO F-UP/LMTD STD: CPT | Mod: 26 | Performed by: INTERNAL MEDICINE

## 2018-01-09 PROCEDURE — 25000128 H RX IP 250 OP 636: Performed by: PHYSICIAN ASSISTANT

## 2018-01-09 PROCEDURE — 93350 STRESS TTE ONLY: CPT | Mod: 26 | Performed by: INTERNAL MEDICINE

## 2018-01-09 PROCEDURE — 25000132 ZZH RX MED GY IP 250 OP 250 PS 637: Performed by: PHYSICIAN ASSISTANT

## 2018-01-09 PROCEDURE — 84443 ASSAY THYROID STIM HORMONE: CPT | Performed by: PHYSICIAN ASSISTANT

## 2018-01-09 PROCEDURE — 25000125 ZZHC RX 250: Performed by: INTERNAL MEDICINE

## 2018-01-09 PROCEDURE — 93325 DOPPLER ECHO COLOR FLOW MAPG: CPT | Mod: 26 | Performed by: INTERNAL MEDICINE

## 2018-01-09 PROCEDURE — 25500064 ZZH RX 255 OP 636: Performed by: INTERNAL MEDICINE

## 2018-01-09 PROCEDURE — 93018 CV STRESS TEST I&R ONLY: CPT | Performed by: INTERNAL MEDICINE

## 2018-01-09 PROCEDURE — 25000128 H RX IP 250 OP 636: Performed by: INTERNAL MEDICINE

## 2018-01-09 PROCEDURE — 36415 COLL VENOUS BLD VENIPUNCTURE: CPT | Performed by: PHYSICIAN ASSISTANT

## 2018-01-09 PROCEDURE — 93016 CV STRESS TEST SUPVJ ONLY: CPT | Performed by: INTERNAL MEDICINE

## 2018-01-09 PROCEDURE — 83735 ASSAY OF MAGNESIUM: CPT | Performed by: PHYSICIAN ASSISTANT

## 2018-01-09 PROCEDURE — G0378 HOSPITAL OBSERVATION PER HR: HCPCS

## 2018-01-09 PROCEDURE — 99207 ZZC APP CREDIT; MD BILLING SHARED VISIT: CPT | Performed by: PHYSICIAN ASSISTANT

## 2018-01-09 PROCEDURE — 84484 ASSAY OF TROPONIN QUANT: CPT | Performed by: PHYSICIAN ASSISTANT

## 2018-01-09 RX ORDER — KETOROLAC TROMETHAMINE 30 MG/ML
15 INJECTION, SOLUTION INTRAMUSCULAR; INTRAVENOUS ONCE
Status: COMPLETED | OUTPATIENT
Start: 2018-01-09 | End: 2018-01-09

## 2018-01-09 RX ORDER — ALUMINA, MAGNESIA, AND SIMETHICONE 2400; 2400; 240 MG/30ML; MG/30ML; MG/30ML
30 SUSPENSION ORAL EVERY 4 HOURS PRN
Status: DISCONTINUED | OUTPATIENT
Start: 2018-01-09 | End: 2018-01-09 | Stop reason: HOSPADM

## 2018-01-09 RX ORDER — ACETAMINOPHEN 500 MG
1000 TABLET ORAL EVERY 6 HOURS PRN
Status: DISCONTINUED | OUTPATIENT
Start: 2018-01-09 | End: 2018-01-09 | Stop reason: HOSPADM

## 2018-01-09 RX ORDER — ONDANSETRON 4 MG/1
4 TABLET, ORALLY DISINTEGRATING ORAL EVERY 6 HOURS PRN
Status: DISCONTINUED | OUTPATIENT
Start: 2018-01-09 | End: 2018-01-09 | Stop reason: HOSPADM

## 2018-01-09 RX ORDER — NALOXONE HYDROCHLORIDE 0.4 MG/ML
.1-.4 INJECTION, SOLUTION INTRAMUSCULAR; INTRAVENOUS; SUBCUTANEOUS
Status: DISCONTINUED | OUTPATIENT
Start: 2018-01-09 | End: 2018-01-09 | Stop reason: HOSPADM

## 2018-01-09 RX ORDER — AMOXICILLIN 250 MG
3 CAPSULE ORAL 2 TIMES DAILY
Status: DISCONTINUED | OUTPATIENT
Start: 2018-01-09 | End: 2018-01-09 | Stop reason: HOSPADM

## 2018-01-09 RX ORDER — NITROGLYCERIN 0.4 MG/1
0.4 TABLET SUBLINGUAL EVERY 5 MIN PRN
Status: DISCONTINUED | OUTPATIENT
Start: 2018-01-09 | End: 2018-01-09 | Stop reason: HOSPADM

## 2018-01-09 RX ORDER — ASPIRIN 81 MG/1
81 TABLET ORAL DAILY
Status: DISCONTINUED | OUTPATIENT
Start: 2018-01-09 | End: 2018-01-09 | Stop reason: HOSPADM

## 2018-01-09 RX ORDER — ACETAMINOPHEN 650 MG/1
650 SUPPOSITORY RECTAL EVERY 4 HOURS PRN
Status: DISCONTINUED | OUTPATIENT
Start: 2018-01-09 | End: 2018-01-09

## 2018-01-09 RX ORDER — MECLIZINE HCL 12.5 MG 12.5 MG/1
12.5 TABLET ORAL ONCE
Status: DISCONTINUED | OUTPATIENT
Start: 2018-01-09 | End: 2018-01-09

## 2018-01-09 RX ORDER — LIDOCAINE 40 MG/G
CREAM TOPICAL
Status: DISCONTINUED | OUTPATIENT
Start: 2018-01-09 | End: 2018-01-09 | Stop reason: HOSPADM

## 2018-01-09 RX ORDER — POLYETHYLENE GLYCOL 3350 17 G/17G
17 POWDER, FOR SOLUTION ORAL DAILY
Status: DISCONTINUED | OUTPATIENT
Start: 2018-01-09 | End: 2018-01-09 | Stop reason: HOSPADM

## 2018-01-09 RX ORDER — LORAZEPAM 0.5 MG/1
0.5 TABLET ORAL EVERY 8 HOURS PRN
Status: DISCONTINUED | OUTPATIENT
Start: 2018-01-09 | End: 2018-01-09 | Stop reason: HOSPADM

## 2018-01-09 RX ORDER — DIPHENHYDRAMINE HYDROCHLORIDE 50 MG/ML
12.5 INJECTION INTRAMUSCULAR; INTRAVENOUS ONCE
Status: COMPLETED | OUTPATIENT
Start: 2018-01-09 | End: 2018-01-09

## 2018-01-09 RX ORDER — METOPROLOL TARTRATE 1 MG/ML
15 INJECTION, SOLUTION INTRAVENOUS
Status: DISCONTINUED | OUTPATIENT
Start: 2018-01-09 | End: 2018-01-09

## 2018-01-09 RX ORDER — DOBUTAMINE HYDROCHLORIDE 200 MG/100ML
5-50 INJECTION INTRAVENOUS CONTINUOUS
Status: DISCONTINUED | OUTPATIENT
Start: 2018-01-09 | End: 2018-01-09

## 2018-01-09 RX ORDER — ACETAMINOPHEN 325 MG/1
650 TABLET ORAL EVERY 4 HOURS PRN
Status: DISCONTINUED | OUTPATIENT
Start: 2018-01-09 | End: 2018-01-09

## 2018-01-09 RX ORDER — PANTOPRAZOLE SODIUM 40 MG/1
40 TABLET, DELAYED RELEASE ORAL
Status: DISCONTINUED | OUTPATIENT
Start: 2018-01-09 | End: 2018-01-09 | Stop reason: HOSPADM

## 2018-01-09 RX ADMIN — PERFLUTREN 6 ML: 6.52 INJECTION, SUSPENSION INTRAVENOUS at 09:35

## 2018-01-09 RX ADMIN — PANTOPRAZOLE SODIUM 40 MG: 40 TABLET, DELAYED RELEASE ORAL at 08:02

## 2018-01-09 RX ADMIN — ATROPINE SULFATE 0.4 MG: 0.4 INJECTION, SOLUTION INTRAMUSCULAR; INTRAVENOUS; SUBCUTANEOUS at 09:30

## 2018-01-09 RX ADMIN — ACETAMINOPHEN 650 MG: 325 TABLET, FILM COATED ORAL at 01:31

## 2018-01-09 RX ADMIN — METOPROLOL TARTRATE 4 MG: 5 INJECTION INTRAVENOUS at 09:33

## 2018-01-09 RX ADMIN — ASPIRIN 81 MG: 81 TABLET, COATED ORAL at 08:02

## 2018-01-09 RX ADMIN — KETOROLAC TROMETHAMINE 15 MG: 30 INJECTION, SOLUTION INTRAMUSCULAR at 02:31

## 2018-01-09 RX ADMIN — DOBUTAMINE IN DEXTROSE 30 MCG/KG/MIN: 200 INJECTION, SOLUTION INTRAVENOUS at 09:22

## 2018-01-09 RX ADMIN — SENNOSIDES AND DOCUSATE SODIUM 3 TABLET: 8.6; 5 TABLET ORAL at 08:03

## 2018-01-09 RX ADMIN — DIPHENHYDRAMINE HYDROCHLORIDE 12.5 MG: 50 INJECTION, SOLUTION INTRAMUSCULAR; INTRAVENOUS at 02:32

## 2018-01-09 ASSESSMENT — ENCOUNTER SYMPTOMS
NO PATIENT REPORTED PAIN: 1
ACTIVITY IMPAIRMENT: NORMAL
DIETARY ISSUES: NPO

## 2018-01-09 NOTE — PLAN OF CARE
Problem: Patient Care Overview  Goal: Plan of Care/Patient Progress Review  Outpatient/Observation goals to be met before discharge home:       Serial troponins and stress test complete. : YES  - Seen and cleared by consultant if applicable : YES  - Adequate pain control on oral analgesia : YES  - Vital signs normal or at patient baseline : YES  - Safe disposition plan has been identified : YES  - Nurse to notify provider when observation goals have been met and patient is ready for discharge

## 2018-01-09 NOTE — H&P
[facility] ED Observation Admission Note    Chief Complaint   Patient presents with     Chest Pain     Shortness of Breath     Loss of Consciousness       Assessment/Plan:  1. Chest Pain: At about 7pm this evening after dinner had an acute onset of sternal chest tightness, no radiation; associated pain around her neck; associated SOB, dizziness, paresthesias, nausea. No vomiting or tachycardia. Has had similar chest pain in the past however this seemed to be persistent and decided to come in. Admits to constipation, headache. Denies heartburn or anxiety surrounding episode. No fever, chills, tinnitus, recent travel, LE edema, h/o DVT, h/o tobacco use or family h/o CAD. Dobutamine stress test done 3/3/15 was normal. Echo 9/27/17 normal. Ziopatch placed on 9/27/17 x 14 days; HR  bpm, avg HR 77; predominantly SR, 1st degree AVB present, BBB present, 1 run VT (4 beats, rate 144). In ED, HR 60's, 's-110's/60's-70's, SaO2 % on RA, Temp 98.5. Normal BMP, CBC. Troponin I negative x 1. EKG with NSR with no acute findings. CXR negative. In ED the patient was given GI cocktail. Off note  given a dose of Nitro and ASA 325mg en route by EMS. Risk Factors include obesity, HLD. Last lipid panel 3/2/17 done at [facility] 3/2/17 with , HDL 39, .  - Serial troponins q4h x 2 more  - Continuous telemetry  - Dobutamine Stress Test in the morning  - Nitro PRN  - ASA 81mg daily  - Clear liquid diet    2. Dizziness: Chronic intermittent. Takes Meclizine prn which helps  - Continue Meclizine prn    3. Headache: Reports nature of headache is not unusual. Usually takes ibuprofen however due to h/o stomach ulcers avoids it but helps  - Toradol 15mg + Benadryl 12.5mg now  - Reassess for sinus tenderness after headache resolved     4. Constipation: Last BM going on 2 weeks now  - Pericolace 3 tabs BID  - Miralax 17gm po daily    5. PTSD:  Sobbing when asked about family history and birth history.  and 4 kids  killed by stray bomb in [facility] a few years ago. Not on any medication for MDD or PTSD. States a counselor has been recommended to her and declined services here.    HPI:    Asa Francis is a 48 year old female with a history of severe symptomatic paroxysmal SVT s/p ablation (10/26/17), PTSD, MDD who presented to the ED via EMS with chest pain.  An  was present to interview patient. The patient reports that about 7pm this evening after dinner had an acute onset of sternal chest tightness, no radiation but had associated pain around her neck. She reports associated SOB, dizziness, paresthesias, nausea. Denies vomiting or tachycardia. She describes her symptoms as so intense that she felt like it was pulling life out of her. She reports a history of dizziness that comes on intermittently. She also reports similar chest pain in the past however this seemed to be persistent and decided to come in. Admits to constipation and feeling bloated however that's not new for her and may go for 2 weeks sometimes before she has a BM. This time she reports she has not had a BM going on 2 weeks now. She denies any heartburn or anxiety surrounding the episode. She reports recent URI symptoms and sinus pressure. Reports a headache since this episode though she admits this headache is not unusual for her and usually take ibuprofen 800mg however has been told its not good for her kidneys and tries not to take it. Denies fever, chills, tinnitus, recent travel, LE edema, h/o DVT, h/o tobacco use or family h/o CAD.     She was admitted to ED Obs in 2015 with similar chest pain and had a dobutamine stress test done 3/3/15 which was normal. Echo 9/27/17 normal. She had a Ziopatch placed on 9/27/17 x 14 days which reported HR  bpm, avg HR 77; predominantly SR, 1st degree AVB present, BBB present, 1 run VT (4 beats, rate 144)    In the ED, HR 60's, 's-110's/60's-70's, SaO2 % on RA, Temp 98.5. Labs show normal  BMP, CBC. Troponin I negative x 1. EKG with NSR with no acute findings. CXR negative. In the ED the patient was given GI cocktail. Off note she was given a dose of Nitro and ASA 325mg en route by EMS. She is being admitted to the Observation Unit for ACS r/o.     On admission to the observation unit the patient was complaining of feeling dizzy with any kind of movement.     History:    Past Medical History:   Diagnosis Date     Diabetes (H)      Hypertension      MDD (major depressive disorder)      PTSD (post-traumatic stress disorder)      SVT (supraventricular tachycardia) (H)        Past Surgical History:   Procedure Laterality Date     EYE SURGERY       Right Arm Surgery         No family history on file.    Social History     Social History     Marital status:      Spouse name: N/A     Number of children: N/A     Years of education: N/A     Occupational History     Not on file.     Social History Main Topics     Smoking status: Never Smoker     Smokeless tobacco: Former User     Alcohol use No     Drug use: No     Sexual activity: Not on file     Other Topics Concern     Not on file     Social History Narrative     and kids were killed in civil war.         No current facility-administered medications on file prior to encounter.   Current Outpatient Prescriptions on File Prior to Encounter:  meclizine (ANTIVERT) 12.5 MG tablet Take 1 tablet (12.5 mg) by mouth 3 times daily as needed for dizziness   ondansetron (ZOFRAN-ODT) 4 MG disintegrating tablet Take 1 tablet (4 mg) by mouth every 6 hours as needed for nausea   pantoprazole (PROTONIX) 20 MG tablet Take by mouth 30-60 minutes before a meal.   albuterol (PROAIR HFA, PROVENTIL HFA, VENTOLIN HFA) 108 (90 BASE) MCG/ACT inhaler Inhale 2 puffs into the lungs every 6 hours   LORAZEPAM PO Take 0.5 mg by mouth every 8 hours as needed for anxiety   MIRTAZAPINE PO Take 45 mg by mouth At Bedtime   Emollient (EUCERIN) LOTN Apply to face/ body twice daily  as needed.   cholecalciferol (VITAMIN D) 1000 UNIT tablet Take 1,000 Units by mouth   cyanocobalamin 1000 MCG TABS Take 1,000 mcg by mouth   loratadine (CLARITIN) 10 MG tablet Take 10 mg by mouth       Data:    Results for orders placed or performed during the hospital encounter of 01/08/18   XR Chest 2 Views    Narrative    Exam: XR CHEST 2 VW, 1/8/2018 9:17 PM    Indication: chest pain;     Comparison: 3/2/2015    Findings:   The cardiomediastinal silhouette and pulmonary vasculature are within  normal limits. No pleural effusion or pneumothorax. No focal airspace  opacity.      Impression    Impression: No acute cardiopulmonary abnormality.    I have personally reviewed the examination and initial interpretation  and I agree with the findings.    KRYSTA BARRIOS MD   CBC with platelets differential   Result Value Ref Range    WBC 4.7 4.0 - 11.0 10e9/L    RBC Count 3.97 3.8 - 5.2 10e12/L    Hemoglobin 12.4 11.7 - 15.7 g/dL    Hematocrit 38.3 35.0 - 47.0 %    MCV 97 78 - 100 fl    MCH 31.2 26.5 - 33.0 pg    MCHC 32.4 31.5 - 36.5 g/dL    RDW 12.4 10.0 - 15.0 %    Platelet Count 208 150 - 450 10e9/L    Diff Method Automated Method     % Neutrophils 33.7 %    % Lymphocytes 55.2 %    % Monocytes 7.1 %    % Eosinophils 3.2 %    % Basophils 0.6 %    % Immature Granulocytes 0.2 %    Nucleated RBCs 0 0 /100    Absolute Neutrophil 1.6 1.6 - 8.3 10e9/L    Absolute Lymphocytes 2.6 0.8 - 5.3 10e9/L    Absolute Monocytes 0.3 0.0 - 1.3 10e9/L    Absolute Eosinophils 0.2 0.0 - 0.7 10e9/L    Absolute Basophils 0.0 0.0 - 0.2 10e9/L    Abs Immature Granulocytes 0.0 0 - 0.4 10e9/L    Absolute Nucleated RBC 0.0    Basic metabolic panel   Result Value Ref Range    Sodium 140 133 - 144 mmol/L    Potassium 3.5 3.4 - 5.3 mmol/L    Chloride 108 94 - 109 mmol/L    Carbon Dioxide 26 20 - 32 mmol/L    Anion Gap 6 3 - 14 mmol/L    Glucose 97 70 - 99 mg/dL    Urea Nitrogen 6 (L) 7 - 30 mg/dL    Creatinine 0.65 0.52 - 1.04 mg/dL    GFR Estimate  >90 >60 mL/min/1.7m2    GFR Estimate If Black >90 >60 mL/min/1.7m2    Calcium 8.8 8.5 - 10.1 mg/dL   Troponin I   Result Value Ref Range    Troponin I ES <0.015 0.000 - 0.045 ug/L   EKG 12 lead   Result Value Ref Range    Interpretation ECG Click View Image link to view waveform and result              EKG Interpretation:      EKG Number: 1  Interpreted by Jose Carlos Albarran PA-C   Symptoms at time of EKG: chest tightness   Rhythm: Normal sinus   Rate: Normal  Axis: Normal  Ectopy: None  Conduction: Normal  ST Segments/ T Waves: No ST-T wave changes and No acute ischemic changes  Q Waves: None  Comparison to prior: Unchanged    Clinical Impression: normal and no acute changes    ROS:    Review Of Systems  Skin: negative  Eyes: negative  Ears/Nose/Throat: positive for earache - bilateral, negative for, nasal congestion, sneezing, tinnitus, persistent sore throat  Respiratory: Shortness of breath- associated with chest pain, Cough- non productive and No hemoptysis  Cardiovascular: positive for chest pain, negative for, palpitations, tachycardia, exertional chest pain or pressure, dyspnea on exertion and lower extremity edema  Gastrointestinal: positive for nausea, abdominal pain, excessive gas or bloating and constipation, negative for, vomiting, heartburn and diarrhea  Genitourinary: negative  Musculoskeletal: negative  Neurologic: headaches, numbness or tingling of hands and dizziness  Psychiatric: PTSD  Hematologic/Lymphatic/Immunologic: negative for, chills and fever  Endocrine: negative  PCP: [facility]  CARDIAC RISK: obesity, HLD    10 point ROS negative other than the symptoms noted above.      Exam:  Vitals:  B/P: 105/68, T: 98.5, P: 71, R: Data Unavailable    Constitutional: alert, mild distress, cooperative and over weight  Head: Normocephalic. No masses, lesions, tenderness or abnormalities  Neck: Neck supple. No adenopathy. Thyroid symmetric, normal size,, Carotids without bruits.  ENT: ENT exam  normal, b/l TM's with light reflex; minimal sinus tenderness  Cardiovascular: negative, PMI normal. No lifts, heaves, or thrills. RRR. No murmurs, clicks gallops or rub  Respiratory: negative, Percussion normal. Good diaphragmatic excursion. Lungs clear  Gastrointestinal: Abdomen soft, mild generalized abdominal tenderness. BS normal. No masses, organomegaly  : Deferred  Musculoskeletal: extremities normal- no gross deformities noted, gait normal and normal muscle tone  Skin: no suspicious lesions or rashes  Neurologic: Gait normal. Reflexes normal and symmetric. Sensation grossly WNL.  Psychiatric: mentation appears normal and affect normal/bright  Hematologic/Lymphatic/Immunologic: normal ant/post cervical, axillary, supraclavicular and inguinal nodes    Consults: none  FEN: clear liquid diet  DVT prophylaxis: encourage ambulation; expect short stay  GI prophylaxis: protonix   BM prophylaxis: none  Code Status: full code  Disposition: discharge home once ACS w/u completed and negative; stable labs and vitals    Signed:  Jose Carlos Albarran PA-C  January 9, 2018 at 12:02 AM

## 2018-01-09 NOTE — PLAN OF CARE
Problem: Patient Care Overview  Goal: Plan of Care/Patient Progress Review  - Serial troponins complete: No, 2/3 normal  - Stress test complete: No, scheduled for this AM   - Seen and cleared by consultant if applicable: No   - Adequate pain control on oral analgesia: No, IV Toradol given   - Vital signs normal or at patient baseline: Yes  - Safe disposition plan has been identified: No

## 2018-01-09 NOTE — DISCHARGE SUMMARY
[facility]  ED Observation Unit  Discharge Summary    Asa Francis MRN# [id number]   Age: 48 year old YOB: 1970     Date of Admission:  1/8/2018  Date of Discharge:  1/9/2018   Admitting Physician:  Liliana Arrington MD  Discharging Provider:  Luda Adams PA-C/Liliana Arrington MD  Primary Care Physician: Estrada, [facility]          Reason for Admission:   Chest pain          Discharge Diagnoses:   1. Acute chest pain, likely non-cardiac  2. Normal Dobutamine stress echo  3. Chronic intermittent dizzines  4. Headache, resolved  5. Constipation  6. PTSD         Brief History of Illness:   Please see the detailed H & P dated 1/8/2018. Briefly, patient is a 48 year old female with a history of severe symptomatic paroxysmal SVT s/p ablation (10/26/17), PTSD, MDD who presented to the ED visa EMS with chest pain. She reports chest pain started about 7pm on evening prior to admission. She reported associated dyspnea and nausea. She also reported concerns of constipation, headache and itchiness.          Physical Exam:   BP 99/68 (BP Location: Right arm)  Pulse 71  Temp 98.1  F (36.7  C) (Oral)  Resp 16  Wt 72.3 kg (159 lb 8 oz)  SpO2 98%  BMI 26.14 kg/m2  GENERAL: Alert and oriented x 3. NAD.   HEENT: Anicteric sclera. Mucous membranes moist.   CV: RRR. S1, S2. No murmurs appreciated.   RESPIRATORY: Effort normal on room air. Lungs CTAB with no wheezing, rales, rhonchi.   GI: Abdomen soft and non distended with normoactive bowel sounds present in all quadrants. No tenderness, rebound, guarding.   EXTREMITIES: No peripheral edema. Intact bilateral pedal pulses.   SKIN: No jaundice. No rashes.           Labs:   Last CBC:   Recent Labs   Lab Test  01/08/18 2040   WBC  4.7   RBC  3.97   HGB  12.4   HCT  38.3   MCV  97   MCH  31.2   MCHC  32.4   RDW  12.4   PLT  208       Last CMP:  Recent Labs   Lab Test  01/08/18   2040   03/02/15   1705   NA  140   < >  138    POTASSIUM  3.5   < >  3.6   CHLORIDE  108   < >  104   REJI  8.8   < >  9.1   CO2  26   < >  28   BUN  6*   < >  9   CR  0.65   < >  0.66   GLC  97   < >  85   AST   --    --   12   ALT   --    --   15   BILITOTAL   --    --   0.2   ALBUMIN   --    --   3.7   PROTTOTAL   --    --   8.5   ALKPHOS   --    --   93    < > = values in this interval not displayed.            Imaging:   CXR (1/8/18):   The cardiomediastinal silhouette and pulmonary vasculature are within normal limits. No pleural effusion or pneumothorax. No focal airspace opacity.    Dobutamine Stress Echo (1/9/18):   Negative, low risk dobutamine stress echocardiogram.  No regional wall motion abnormalities at rest and with infusion of dobutamine.  Normal LV size and function at rest. The LVEF is 55-60% and increases  appropriately to 70-75% with dobutamine infusion.  Normal blood pressure at rest. Normal blood pressure response to dobutamine  infusion.  No ECG evidence of ischemia at rest and with infusion of dobutamine.  No subjective evidence of ischemia at rest and with infusion of dobutamine.  No significant valvular disease noted on routine screening color flow Doppler  and pulsed Doppler examination. The ascending aortic segment is normal.           Procedures:   None        Consultations:   None         Hospital Course:   1. Chest pain. Prolonged episode prior to admission. Given ASA/Nitro by EMS en route. EKG without acute changes, NSR. Troponins negative x3. No fevers, chills, tinnitus, recent travel, h/o DVT or family h/o CAD. CXR negative. Given GI cocktail in the ED. Chest pain free on the unit. Underwent Dobutamine stress echo, no pain during the test, low risk study, negative for ischemia. We reviewed possible non cardiac causes of pain, GI etiology possibly given recent issues with constipation.     2. Chronic intermittent dizziness, stable with meclizine use.    3. Headache, resolved with toradol. No reported recurrence.    4.  Constipation. Tolerating stool softeners, encouraged her to continue at discharge. Discussed importance of high fiber diet, maintaining fluid intake.    5. PTSD, MDD. Has met with a counselor in the past. Very emotional when asked about family history on admission. Declined services here.           Discharge Medications:     Current Discharge Medication List      CONTINUE these medications which have NOT CHANGED    Details   meclizine (ANTIVERT) 12.5 MG tablet Take 1 tablet (12.5 mg) by mouth 3 times daily as needed for dizziness  Qty: 20 tablet, Refills: 0    Associated Diagnoses: Dizziness      ondansetron (ZOFRAN-ODT) 4 MG disintegrating tablet Take 1 tablet (4 mg) by mouth every 6 hours as needed for nausea  Qty: 20 tablet, Refills: 0    Associated Diagnoses: Nausea      pantoprazole (PROTONIX) 20 MG tablet Take by mouth 30-60 minutes before a meal.  Qty: 30 tablet, Refills: 0    Associated Diagnoses: Esophageal reflux      albuterol (PROAIR HFA, PROVENTIL HFA, VENTOLIN HFA) 108 (90 BASE) MCG/ACT inhaler Inhale 2 puffs into the lungs every 6 hours      LORAZEPAM PO Take 0.5 mg by mouth every 8 hours as needed for anxiety      MIRTAZAPINE PO Take 45 mg by mouth At Bedtime      Emollient (EUCERIN) LOTN Apply to face/ body twice daily as needed.      cholecalciferol (VITAMIN D) 1000 UNIT tablet Take 1,000 Units by mouth      cyanocobalamin 1000 MCG TABS Take 1,000 mcg by mouth      loratadine (CLARITIN) 10 MG tablet Take 10 mg by mouth                  Discharge Disposition:   Discharged to home    Code status on discharge: Full Code              Discharge Instructions:     Discharge Procedure Orders  Reason for your hospital stay   Order Comments: You were hospitalized due to chest pain symptoms. Your cardiac enzymes were reassuring and not elevated. Your stress test was low risk, no evidence or concern for heart artery blockage. Please follow up with primary care to review non-cardiac causes of chest discomfort.      Follow Up and recommended labs and tests   Order Comments: Follow up with primary care provider, [facility], within 7 days for hospital follow up.     Activity   Order Comments: Your activity upon discharge: activity as tolerated   Order Specific Question Answer Comments   Is discharge order? Yes      When to contact your care team   Order Comments: Call your primary doctor if you have any of the following:  increased shortness of breath or increased chest pain.     Full Code     Diet   Order Comments: Follow this diet upon discharge: Regular Diet   Order Specific Question Answer Comments   Is discharge order? Yes           Patient evaluated by Dr. Arrington on day of discharge; in agreement with plan of care.     Luda Adams  [facility]  Pager: [phone]

## 2018-01-09 NOTE — PROGRESS NOTES
Asa Francis is a 48 year old female patient.  1. Acute chest pain      Past Medical History:   Diagnosis Date     Diabetes (H)      Hypertension      MDD (major depressive disorder)      PTSD (post-traumatic stress disorder)     4 children killed by stray bomb in [facility]     SVT (supraventricular tachycardia) (H)      No current outpatient prescriptions on file.     No Known Allergies  Active Problems:    * No active hospital problems. *    Blood pressure 99/68, pulse 71, temperature 98.1  F (36.7  C), temperature source Oral, resp. rate 16, weight 72.3 kg (159 lb 8 oz), SpO2 98 %.    Subjective:  Symptoms:  Resolved.  She reports chest pain.    Diet:  NPO.    Activity level: Normal.    Pain:  She reports no pain.      Objective:  General Appearance:  Comfortable.    Vital signs: (most recent): Blood pressure 99/68, pulse 71, temperature 98.1  F (36.7  C), temperature source Oral, resp. rate 16, weight 72.3 kg (159 lb 8 oz), SpO2 98 %.  Vital signs are normal.    Output: Producing urine.    HEENT: Normal HEENT exam.    Lungs:  Normal respiratory rate and normal effort.  Breath sounds clear to auscultation.    Heart: Normal rate.  Regular rhythm.  S1 normal and S2 normal.    Extremities: Normal range of motion.    Neurological: Patient is alert and oriented to person, place and time.    Skin:  Warm.    Abdomen: Abdomen is soft.  Bowel sounds are normal.   There is no abdominal tenderness.     Pupils:  Pupils are equal, round, and reactive to light.    Pulses: Distal pulses are intact.      Assessment:    Condition: In stable condition.  Improving.   (48 yof with h/o SVT ablation, PTSD MDD presents with CP, serial trop and EKG neg, Dobutamine echo this am. If neg, plan to DC to home.).       Liliana Arrington MD  1/9/2018    The pt was seen and examined by myself. The case was reviewed and the plan was discussed with the ANNEMARIE.

## 2018-01-09 NOTE — PROGRESS NOTES
BP 99/68 (BP Location: Right arm)  Pulse 71  Temp 98.1  F (36.7  C) (Oral)  Resp 16  Wt 72.3 kg (159 lb 8 oz)  SpO2 98%  BMI 26.14 kg/m2  Patient discharged from unit 6D at 1430.  Went over discharge paperwork and discharge instructions with patient via [place] , who verbalized understanding.  Patient's IV discontinued.  Patient discharged from unit independently ambulatory to return home with family, to return home via car.

## 2018-01-09 NOTE — ED NOTES
Pt has been having chest pain and shortness of breath for the past hr. She has a significant cardiac history with recent cardiac surgery.  She got one dose of nitro and 324 of aspirin from EMS. She has an 18G in LAC.

## 2018-01-09 NOTE — ED PROVIDER NOTES
"  History     Chief Complaint   Patient presents with     Chest Pain     Shortness of Breath     Loss of Consciousness     HPI  Asa Francis is a 48 year old female with past medical history notable for paroxysmal SVT who presents with chest pain.  The patient's friend provided interpretation services initially at the patient's request.  The patient reports that about 1 hour prior to presentation she had been eating when she had an acute onset of pain in the front slightly left side of her chest.  She had mild associated shortness of breath.  The chest pain was \"tight\" quality.  No lightheadedness, no loss of consciousness/syncope.  The patient did note that she sat on the floor soon after but clarified that did not have any fainting spell.    The patient was given 1 dose of nitro and 324 mg of aspirin from EMS on route without change in the pain.    I have reviewed the Medications, Allergies, Past Medical and Surgical History, and Social History in the Epic system.    Review of Systems  A complete 14-system review of systems was completed with pertinent positives and negatives noted in the HPI, otherwise negative.     Physical Exam   BP: 113/65  Pulse: 71  Heart Rate: 66  Temp: 98.5  F (36.9  C)  Weight: 73.2 kg (161 lb 6.4 oz)  SpO2: 100 %      Physical Exam  /77  Pulse 71  Temp 98.5  F (36.9  C) (Oral)  Wt 73.2 kg (161 lb 6.4 oz)  SpO2 99%  BMI 26.45 kg/m2  General: well-appearing, no acute distress  HENT: MMM, no oropharyngeal lesions  Eyes: PERRL, normal sclerae, no conjunctival pallor  Neck: non-tender, supple  Cardio: RRR, normal heart sounds, extremities well perfused  Resp: Normal work of breathing, clear breath sounds  Chest/Back: no visual signs of trauma, no CVA tenderness  Abdomen: no tenderness, non-distended, no rebound, no guarding  Neuro: alert and fully oriented. CN II-XII grossly intact. Grossly normal strength and sensation in all extremities.   MSK: no deformities. "   Integumentary/Skin: no rash, normal color  Psych: normal affect, normal behavior    ED Course     ED Course     Procedures             EKG Interpretation:      Interpreted by Kanu Hawley  Time reviewed: 2030  Symptoms at time of EKG: chest pain   Rhythm: normal sinus   Rate: normal  Axis: leftward  Ectopy: none  Conduction: normal  ST Segments/ T Waves: No ST-T wave changes  Q Waves: none  Comparison to prior: Unchanged from 10/26/2017    Clinical Impression: normal EKG    Critical Care time:  none       Labs Ordered and Resulted from Time of ED Arrival Up to the Time of Departure from the ED   BASIC METABOLIC PANEL - Abnormal; Notable for the following:        Result Value    Urea Nitrogen 6 (*)     All other components within normal limits   CBC WITH PLATELETS DIFFERENTIAL   TROPONIN I   PERIPHERAL IV CATHETER   CARDIAC CONTINUOUS MONITORING   IP ASSIGN PROVIDER TEAM TO TREATMENT TEAM   OBSERVATION GOALS   PATIENT CARE ORDER   CARDIAC CONTINUOUS MONITORING   ASSESS   VITAL SIGNS   ACTIVITY   PERIPHERAL IV CATHETER            Assessments & Plan (with Medical Decision Making)   In the ED, the patient was afebrile and hemodynamically stable. History notable for acute onset of chest tightness and pain 1 hour prior to presentation. Exam notable for regular rate and rhythm heart, normal mental status, clear lungs.  EKG showed normal sinus rhythm without evidence of acute ischemia, initial troponin negative.  Given short duration from symptom onset to presentation in the ED cannot completely rule out ACS from single troponin, will likely need extended observation with serial troponins.  PERC met - PE very unlikely.  No evidence of pneumothorax or pneumonia on chest x-ray.  Severity/quality does not fit aortic dissection well.  GI cocktail given with slight improvement in symptoms.    Complete clinical picture is consistent with chest pain unclear etiology.  Plan observation with serial troponins to fully evaluate  for possible ACS. After counseling on the diagnosis, work-up, and treatment plan, the patient was admitted to ED obs.     Clinical Impression:   Chest pain    Kanu Hawley MD  Emergency Medicine       I have reviewed the nursing notes.    I have reviewed the findings, diagnosis, plan and need for follow up with the patient.    New Prescriptions    No medications on file       Final diagnoses:   Acute chest pain       1/8/2018   [facility], [place], EMERGENCY DEPARTMENT     Kanu Hawley MD  01/09/18 0031

## 2018-01-09 NOTE — ED NOTES
Pt is Portuguese speaking and requests an in person . Her neighbor is here interpreting for now. Her neighbor also reports a syncopal episode at home

## 2018-01-09 NOTE — PLAN OF CARE
Problem: Patient Care Overview  Goal: Plan of Care/Patient Progress Review  List all goals to be met before discharge home:     - Serial troponins and stress test complete. : NO  - Seen and cleared by consultant if applicable : NO  - Adequate pain control on oral analgesia : NO  - Vital signs normal or at patient baseline : YES  - Safe disposition plan has been identified : YES  - Nurse to notify provider when observation goals have been met and patient is ready for discharge

## 2018-01-29 ENCOUNTER — PRE VISIT (OUTPATIENT)
Dept: CARDIOLOGY | Facility: CLINIC | Age: 48
End: 2018-01-29

## 2018-10-24 ENCOUNTER — TRANSFERRED RECORDS (OUTPATIENT)
Dept: HEALTH INFORMATION MANAGEMENT | Facility: CLINIC | Age: 48
End: 2018-10-24

## 2019-05-13 ENCOUNTER — OFFICE VISIT (OUTPATIENT)
Dept: CARDIOLOGY | Facility: CLINIC | Age: 49
End: 2019-05-13
Attending: INTERNAL MEDICINE
Payer: COMMERCIAL

## 2019-05-13 VITALS
HEIGHT: 65 IN | SYSTOLIC BLOOD PRESSURE: 104 MMHG | WEIGHT: 154.6 LBS | OXYGEN SATURATION: 99 % | DIASTOLIC BLOOD PRESSURE: 69 MMHG | BODY MASS INDEX: 25.76 KG/M2 | HEART RATE: 64 BPM

## 2019-05-13 DIAGNOSIS — Z86.79 S/P CATHETER ABLATION OF SLOW PATHWAY: ICD-10-CM

## 2019-05-13 DIAGNOSIS — Z98.890 S/P CATHETER ABLATION OF SLOW PATHWAY: ICD-10-CM

## 2019-05-13 DIAGNOSIS — R00.2 PALPITATIONS: Primary | ICD-10-CM

## 2019-05-13 DIAGNOSIS — I47.19 AVNRT (AV NODAL RE-ENTRY TACHYCARDIA) (H): ICD-10-CM

## 2019-05-13 DIAGNOSIS — R00.2 PALPITATIONS: ICD-10-CM

## 2019-05-13 PROCEDURE — 93005 ELECTROCARDIOGRAM TRACING: CPT | Mod: 59

## 2019-05-13 PROCEDURE — 99213 OFFICE O/P EST LOW 20 MIN: CPT | Mod: ZP | Performed by: INTERNAL MEDICINE

## 2019-05-13 PROCEDURE — 0296T ZIO PATCH HOLTER ADULT PEDIATRIC GREATER THAN 48 HRS: CPT | Mod: ZF

## 2019-05-13 PROCEDURE — 93010 ELECTROCARDIOGRAM REPORT: CPT | Mod: ZP | Performed by: INTERNAL MEDICINE

## 2019-05-13 PROCEDURE — G0463 HOSPITAL OUTPT CLINIC VISIT: HCPCS | Mod: 25,ZF

## 2019-05-13 ASSESSMENT — ENCOUNTER SYMPTOMS
DIARRHEA: 0
DOUBLE VISION: 0
NUMBNESS: 1
HOARSE VOICE: 0
ORTHOPNEA: 1
TASTE DISTURBANCE: 0
BLOATING: 1
SNORES LOUDLY: 1
LOSS OF CONSCIOUSNESS: 1
MEMORY LOSS: 1
TINGLING: 0
HYPERTENSION: 1
WHEEZING: 1
POSTURAL DYSPNEA: 1
DYSPNEA ON EXERTION: 1
DEPRESSION: 1
SWOLLEN GLANDS: 0
DISTURBANCES IN COORDINATION: 0
BRUISES/BLEEDS EASILY: 1
COUGH DISTURBING SLEEP: 0
HEADACHES: 1
EYE IRRITATION: 0
NAIL CHANGES: 0
PALPITATIONS: 1
TREMORS: 0
BOWEL INCONTINENCE: 0
HYPOTENSION: 0
EXERCISE INTOLERANCE: 1
SLEEP DISTURBANCES DUE TO BREATHING: 1
PARALYSIS: 0
TROUBLE SWALLOWING: 1
LEG PAIN: 1
SKIN CHANGES: 0
EYE REDNESS: 1
VOMITING: 0
HEARTBURN: 1
POOR WOUND HEALING: 0
EYE PAIN: 1
SPUTUM PRODUCTION: 0
JAUNDICE: 0
EYE WATERING: 0
BLOOD IN STOOL: 0
SHORTNESS OF BREATH: 0
DIZZINESS: 0
COUGH: 0
PANIC: 0
SINUS PAIN: 0
SPEECH CHANGE: 0
HEMOPTYSIS: 0
LIGHT-HEADEDNESS: 1
RECTAL PAIN: 0
DECREASED CONCENTRATION: 1
SYNCOPE: 1
NERVOUS/ANXIOUS: 0
SMELL DISTURBANCE: 0
ABDOMINAL PAIN: 0
SINUS CONGESTION: 1
NECK MASS: 0
SEIZURES: 0
NAUSEA: 0
INSOMNIA: 1
SORE THROAT: 0
WEAKNESS: 0
CONSTIPATION: 1

## 2019-05-13 ASSESSMENT — PAIN SCALES - GENERAL: PAINLEVEL: MODERATE PAIN (5)

## 2019-05-13 ASSESSMENT — MIFFLIN-ST. JEOR: SCORE: 1332.14
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
## 2019-05-13 NOTE — NURSING NOTE
Chief Complaint   Patient presents with     Follow Up     Rtn-Arrhythmia       Vitals were taken and medications were reconciled.    EKG performed by Yin Cook    9:02 AM

## 2019-05-13 NOTE — PROGRESS NOTES
"Per Dr. Meza, patient to have 14 Zio Patch monitor placed.  Diagnosis: Palpitations, R00.2  Monitor placed: {YES / NO:660842::\"Yes\"}  Patient Instructed: {YES / NO:182751::\"Yes\"}  Patient verbalized understanding: {YES / NO:853624::\"Yes\"}  Holter # [id number]    Placed by Erasto Swain MA      "
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
## 2020-04-16 ENCOUNTER — APPOINTMENT (OUTPATIENT)
Dept: INTERPRETER SERVICES | Facility: CLINIC | Age: 50
End: 2020-04-16
Payer: COMMERCIAL

## 2020-04-20 ENCOUNTER — VIRTUAL VISIT (OUTPATIENT)
Dept: CARDIOLOGY | Facility: CLINIC | Age: 50
End: 2020-04-20
Attending: INTERNAL MEDICINE
Payer: COMMERCIAL

## 2020-04-20 VITALS — WEIGHT: 152 LBS | BODY MASS INDEX: 25.29 KG/M2

## 2020-04-20 DIAGNOSIS — Z86.79 S/P CATHETER ABLATION OF SLOW PATHWAY: ICD-10-CM

## 2020-04-20 DIAGNOSIS — Z98.890 S/P CATHETER ABLATION OF SLOW PATHWAY: ICD-10-CM

## 2020-04-20 DIAGNOSIS — R00.2 PALPITATIONS: Primary | ICD-10-CM

## 2020-04-20 PROCEDURE — 99212 OFFICE O/P EST SF 10 MIN: CPT | Mod: 95 | Performed by: INTERNAL MEDICINE

## 2020-04-20 RX ORDER — VALACYCLOVIR HYDROCHLORIDE 1 G/1
1 TABLET, FILM COATED ORAL
COMMUNITY
Start: 2019-10-04

## 2020-04-20 RX ORDER — DILTIAZEM HYDROCHLORIDE 120 MG/1
120 CAPSULE, EXTENDED RELEASE ORAL AT BEDTIME
Qty: 30 CAPSULE | Refills: 11 | Status: SHIPPED | OUTPATIENT
Start: 2020-04-20 | End: 2024-09-12

## 2020-04-20 ASSESSMENT — PAIN SCALES - GENERAL: PAINLEVEL: NO PAIN (0)

## 2020-04-20 NOTE — LETTER
"4/20/2020      RE: Asa Francis  [address]  [place] [address]       Asa Francis is a 50 year old female who is being evaluated via a billable telephone visit.      The patient has been notified of following:     \"This telephone visit will be conducted via a call between you and your physician/provider. We have found that certain health care needs can be provided without the need for a physical exam.  This service lets us provide the care you need with a short phone conversation.  If a prescription is necessary we can send it directly to your pharmacy.  If lab work is needed we can place an order for that and you can then stop by our lab to have the test done at a later time.    Telephone visits are billed at different rates depending on your insurance coverage. During this emergency period, for some insurers they may be billed the same as an in-person visit.  Please reach out to your insurance provider with any questions.    If during the course of the call the physician/provider feels a telephone visit is not appropriate, you will not be charged for this service.\"    Patient has given verbal consent for Telephone visit?  Yes    How would you like to obtain your AVS? Mail a copy    Not able to obtain blood pressure.    Additional provider notes:     She underwent ablation for typical AV alessandro reentrant tachycardia.  She has been reporting her heart racing at night.  At our last visit a 2-week monitor was ordered.  She reports that that monitor fell off and it was never completed.  She reports her episodes of brown more frequent.  She has this every night now.  She has it sometimes during the day.  It is not the same as her SVT prior to ablation.  But she does report it makes her feel short of breath.    Given the frequency of the symptoms we will make arrangements for a 48-hour Holter monitor.  However, she would like to wait until the coronavirus risk is less.  In the interim she " will start diltiazem 120 mg at bedtime as empiric treatment for her palpitations.  We discussed possible side effects of the medication.    Phone call duration: 6:23 minutes    MD Cuco Berry MD

## 2020-04-20 NOTE — PROGRESS NOTES
"Asa Francis is a 50 year old female who is being evaluated via a billable telephone visit.      The patient has been notified of following:     \"This telephone visit will be conducted via a call between you and your physician/provider. We have found that certain health care needs can be provided without the need for a physical exam.  This service lets us provide the care you need with a short phone conversation.  If a prescription is necessary we can send it directly to your pharmacy.  If lab work is needed we can place an order for that and you can then stop by our lab to have the test done at a later time.    Telephone visits are billed at different rates depending on your insurance coverage. During this emergency period, for some insurers they may be billed the same as an in-person visit.  Please reach out to your insurance provider with any questions.    If during the course of the call the physician/provider feels a telephone visit is not appropriate, you will not be charged for this service.\"    Patient has given verbal consent for Telephone visit?  Yes    How would you like to obtain your AVS? Mail a copy    Not able to obtain blood pressure.    Additional provider notes:     She underwent ablation for typical AV alessandro reentrant tachycardia.  She has been reporting her heart racing at night.  At our last visit a 2-week monitor was ordered.  She reports that that monitor fell off and it was never completed.  She reports her episodes of brown more frequent.  She has this every night now.  She has it sometimes during the day.  It is not the same as her SVT prior to ablation.  But she does report it makes her feel short of breath.    Given the frequency of the symptoms we will make arrangements for a 48-hour Holter monitor.  However, she would like to wait until the coronavirus risk is less.  In the interim she will start diltiazem 120 mg at bedtime as empiric treatment for her palpitations.  We discussed " possible side effects of the medication.    Phone call duration: 6:23 minutes    Cuco Meza MD

## 2020-04-22 DIAGNOSIS — I47.19 AVNRT (AV NODAL RE-ENTRY TACHYCARDIA) (H): Primary | ICD-10-CM

## 2022-03-07 ENCOUNTER — OFFICE VISIT (OUTPATIENT)
Dept: CARDIOLOGY | Facility: CLINIC | Age: 52
End: 2022-03-07
Attending: INTERNAL MEDICINE
Payer: COMMERCIAL

## 2022-03-07 VITALS
WEIGHT: 180.9 LBS | HEART RATE: 87 BPM | BODY MASS INDEX: 30.1 KG/M2 | DIASTOLIC BLOOD PRESSURE: 73 MMHG | SYSTOLIC BLOOD PRESSURE: 116 MMHG | OXYGEN SATURATION: 99 %

## 2022-03-07 DIAGNOSIS — I47.10 SVT (SUPRAVENTRICULAR TACHYCARDIA) (H): Primary | ICD-10-CM

## 2022-03-07 DIAGNOSIS — R94.31 ABNORMAL ELECTROCARDIOGRAM: ICD-10-CM

## 2022-03-07 PROCEDURE — 93005 ELECTROCARDIOGRAM TRACING: CPT

## 2022-03-07 PROCEDURE — G0463 HOSPITAL OUTPT CLINIC VISIT: HCPCS | Mod: 25

## 2022-03-07 PROCEDURE — 99214 OFFICE O/P EST MOD 30 MIN: CPT | Performed by: INTERNAL MEDICINE

## 2022-03-07 ASSESSMENT — PAIN SCALES - GENERAL: PAINLEVEL: NO PAIN (0)
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
## 2022-03-07 NOTE — NURSING NOTE
Chief Complaint   Patient presents with     Follow Up     2 yr follow up (Previous Susana pt) s/p SVT ablation 2017     Vitals were taken, medications reconciled, and EKG was performed.    Anthony Altamirano, EMT  11:47 AM

## 2022-03-07 NOTE — PATIENT INSTRUCTIONS
You were seen in the Electrophysiology Clinic today by: Dr León    Plan:     Follow up visit: As needed with Cardiology      Your Care Team:  EP Cardiology   Telephone Number     Nurse Line  Shira Gonzalez RN  [phone]     For scheduling appts or procedures:    [facility]   [phone]   For the Device Clinic (Pacemakers, ICDs, Loop Recorders)    During business hours: [phone]  After business hours:   [phone]- select option 4 and ask for job code [id number].     On-call cardiologist for after hours or on weekends: [phone], option #4, and ask to speak to the on-call cardiologist.     Cardiovascular Clinic:   [address] [facility] [place] [address]      As always, Thank you for trusting us with your health care needs!

## 2022-03-08 LAB
ATRIAL RATE - MUSE: 83 BPM
DIASTOLIC BLOOD PRESSURE - MUSE: NORMAL MMHG
INTERPRETATION ECG - MUSE: NORMAL
P AXIS - MUSE: 77 DEGREES
PR INTERVAL - MUSE: 184 MS
QRS DURATION - MUSE: 110 MS
QT - MUSE: 392 MS
QTC - MUSE: 460 MS
R AXIS - MUSE: -32 DEGREES
SYSTOLIC BLOOD PRESSURE - MUSE: NORMAL MMHG
T AXIS - MUSE: 69 DEGREES
VENTRICULAR RATE- MUSE: 83 BPM

## 2024-05-13 ENCOUNTER — TELEPHONE (OUTPATIENT)
Dept: CARDIOLOGY | Facility: CLINIC | Age: 54
End: 2024-05-13
Payer: COMMERCIAL

## 2024-05-13 NOTE — TELEPHONE ENCOUNTER
5/13/2024 4:01PM Arabella Pack  Patient confirmed rescheduled appointment:  Date: 9/12/2024  Time: 8:30AM  Visit type: Return Provider Change (Return EP)  Provider: Dr. Callaway   [address] [place] [address]  Testing/imaging: NA  Additional notes: 5/13 Pt requesting sooner Return EP/Return Provider Change appt. Pt not sure if she can travel to other [facility] locations. Scheduled next available w/ Dr. Callaway and updated address on file per pt. MALLORIE Pack 5/13/2024 4:01PM

## 2024-07-19 ENCOUNTER — MEDICAL CORRESPONDENCE (OUTPATIENT)
Dept: HEALTH INFORMATION MANAGEMENT | Facility: CLINIC | Age: 54
End: 2024-07-19
Payer: COMMERCIAL

## 2024-07-22 ENCOUNTER — TRANSCRIBE ORDERS (OUTPATIENT)
Dept: OTHER | Age: 54
End: 2024-07-22

## 2024-07-22 DIAGNOSIS — Z79.899 LONG-TERM USE OF PLAQUENIL: ICD-10-CM

## 2024-07-22 DIAGNOSIS — M06.09 SERONEGATIVE RHEUMATOID ARTHRITIS OF MULTIPLE SITES (H): Primary | ICD-10-CM

## 2024-07-24 ENCOUNTER — TRANSCRIBE ORDERS (OUTPATIENT)
Dept: OTHER | Age: 54
End: 2024-07-24

## 2024-07-24 DIAGNOSIS — Z79.899 LONG-TERM USE OF PLAQUENIL: ICD-10-CM

## 2024-07-24 DIAGNOSIS — M06.09 SERONEGATIVE RHEUMATOID ARTHRITIS OF MULTIPLE SITES (H): Primary | ICD-10-CM

## 2024-09-12 ENCOUNTER — OFFICE VISIT (OUTPATIENT)
Dept: CARDIOLOGY | Facility: CLINIC | Age: 54
End: 2024-09-12
Attending: INTERNAL MEDICINE
Payer: COMMERCIAL

## 2024-09-12 VITALS
SYSTOLIC BLOOD PRESSURE: 112 MMHG | OXYGEN SATURATION: 98 % | BODY MASS INDEX: 27.01 KG/M2 | DIASTOLIC BLOOD PRESSURE: 78 MMHG | HEART RATE: 70 BPM | WEIGHT: 162.3 LBS

## 2024-09-12 DIAGNOSIS — Z98.890 HISTORY OF CARDIAC RADIOFREQUENCY ABLATION (RFA): ICD-10-CM

## 2024-09-12 DIAGNOSIS — R07.89 CHEST WALL PAIN: Primary | ICD-10-CM

## 2024-09-12 PROCEDURE — G0463 HOSPITAL OUTPT CLINIC VISIT: HCPCS | Performed by: INTERNAL MEDICINE

## 2024-09-12 PROCEDURE — 99215 OFFICE O/P EST HI 40 MIN: CPT | Performed by: INTERNAL MEDICINE

## 2024-09-12 PROCEDURE — 93005 ELECTROCARDIOGRAM TRACING: CPT

## 2024-09-12 ASSESSMENT — PAIN SCALES - GENERAL: PAINLEVEL: MODERATE PAIN (5)

## 2024-09-12 NOTE — PATIENT INSTRUCTIONS
You were seen in the Electrophysiology Clinic today by: Dr. Callaway     Plan:   Medication Changes: None    Labs/Tests Needed:   Echocardiogram    Ultrasound to assess left under arm pain     Follow up Visit: with Dr. Callaway as needed.     Further Instructions: Dr. Callaway will update you with results via telephone.       If you have further questions, please utilize [facility] to contact us.     Your Care Team:    EP Cardiology   Telephone Number     Nurse Line  Aidan Escobedo RN    [phone]     For scheduling appointments:   [facility]   [phone]   For procedure scheduling:    [facility] Vic[facility]     [phone]   For the Device Clinic (Pacemakers, ICDs, Loop Recorders)    During business hours: [phone]  After business hours:   [phone]- select option 4 and ask for job code [id number].       On-call cardiologist for after hours or on weekends:   [phone], option #4, and ask to speak to the on-call cardiologist.     Cardiovascular Clinic:   [address] [place] [address]. [place] [address]      As always, Thank you for trusting us with your health care needs!

## 2024-09-12 NOTE — NURSING NOTE
Chief Complaint   Patient presents with    Follow Up     Return Provider Change-  2 year EP follow up. Last saw Dr. León on 3/7/22     Vitals were taken, medications reconciled, and EKG was performed.    DONN Ta  1:08 PM
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
## 2024-09-12 NOTE — PROGRESS NOTES
Chief complaint: Palpitations    HPI: A  was used (Haitian).   Ms. Asa Francis is a 47 year old  female who presents today with palpitations for the last 4 months. She reports daily palpitations once or twice a day for up to 15 min. She usually feels dizzy and chest pain during palpitations. On 8/25/2017 she felt palpitations again which lasted for about 45 min, associated with dizziness and she called paramedics. Paramedics took an ECG which showed narrow complex tachycardia (Regular RR intervals,  bpm). Then she presented to ER and her rhythm apperantly terminated spontaneously since ED provider notes doesnot mention about any medication being administered to terminate the tachycardia. She only received IV saline in ER and discharged home to follow up with cardiology. On September 6, 2017 she felt palpitations again while she was in bed that persisted for some time. Palpitations did not stop, she felt like she had an urged feeling to urinate, got up from bed and felt dizzy and fell on to the floor. She hit her head and had a big bruise on her forehead. Bruise is still seen today on her forehead.There wasnot anbody who witnessed the event. She was not able to get up, she stayed on the floor for some time, then she somehow managed to drink water and her palpitations stopped. She reports usually feeling very weak when her heart starts racing and she usually has to sit down. Her palpitations start gradually and stop gradually.  She has no known cardiac disease. She denies PND, ortopnea or LE edema      Risk factors: HT(-), DM(-), smoking (-), FH of palpitations or CAD (-)            Personal, family, and social history reviewed with patient and revised.    Interval history 9/12/2024:  Patient underwent SVT ablation (AVNRT) in 2017.   Last seen in cardiology clinic in 2022.  Being seen today for follow-up.  Patient tells me that over the last 1 year or so she feels extreme pain when she  lies on her left side.  She sometimes wake up with nausea and sweating.  She reports stabbing pain on the chest but more so beneath the left arm around the armpit.  This is constant all the time.  Physical activity makes it worse.  It is every day.  She tells me that she feels short of breath when she lies on her left side.  Sometimes feels palpitations.  No lower extremity edema or syncope.    Patient follows with rheumatology with regards to seronegative rheumatoid arthritis.  On methotrexate and hydroxychloroquine.    PAST MEDICAL HISTORY:  PTSD  Seronegative rheumatoid arthritis    CURRENT MEDICATIONS:  Current Outpatient Medications   Medication Sig Dispense Refill    albuterol (PROAIR HFA, PROVENTIL HFA, VENTOLIN HFA) 108 (90 BASE) MCG/ACT inhaler Inhale 2 puffs into the lungs every 6 hours      cholecalciferol (VITAMIN D) 1000 UNIT tablet Take 1,000 Units by mouth      cyanocobalamin 1000 MCG TABS Take 1,000 mcg by mouth      diltiazem (CARDIZEM SR) 120 MG CP12 12 hr SR capsule Take 120 mg by mouth At Bedtime 30 capsule 11    Emollient (EUCERIN) LOTN Apply to face/ body twice daily as needed.      loratadine (CLARITIN) 10 MG tablet Take 10 mg by mouth      LORAZEPAM PO Take 0.5 mg by mouth every 8 hours as needed for anxiety      meclizine (ANTIVERT) 12.5 MG tablet Take 1 tablet (12.5 mg) by mouth 3 times daily as needed for dizziness 20 tablet 0    MIRTAZAPINE PO Take 45 mg by mouth At Bedtime      ondansetron (ZOFRAN-ODT) 4 MG disintegrating tablet Take 1 tablet (4 mg) by mouth every 6 hours as needed for nausea 20 tablet 0    pantoprazole (PROTONIX) 20 MG tablet Take by mouth 30-60 minutes before a meal. 30 tablet 0    valACYclovir (VALTREX) 1000 mg tablet Take 1 g by mouth         PAST SURGICAL HISTORY:  Past Surgical History:   Procedure Laterality Date    EYE SURGERY      Right Arm Surgery         ALLERGIES:   No Known Allergies    FAMILY HISTORY:  Family History   Problem Relation Age of Onset     Medical History Unknown Other         4 children killed by stray bomb in [facility]         SOCIAL HISTORY:  Social History     Tobacco Use    Smoking status: Never    Smokeless tobacco: Former   Substance Use Topics    Alcohol use: No    Drug use: No       ROS:   Constitutional: No fever, chills, or sweats. Weight stable.   Cardiovascular: As per HPI.   Musculoskeletal: No myalgia.    Exam:  /78 (BP Location: Right arm, Patient Position: Chair, Cuff Size: Adult Regular)   Pulse 70   Wt 73.6 kg (162 lb 4.8 oz)   SpO2 98%   BMI 27.01 kg/m    GENERAL APPEARANCE: healthy, alert and no distress  HEENT: no icterus, no central cyanosis  LYMPH/NECK: no adenopathy, no asymmetry, JVP not elevated  RESPIRATORY: lungs clear to auscultation - no rales, rhonchi or wheezes, no use of accessory muscles, no retractions, respirations are unlabored, normal respiratory rate  CARDIOVASCULAR: regular rhythm, normal S1, S2, no S3 or S4 and no murmur, click or rub, precordium quiet with normal PMI.  GI: soft, non tender  EXTREMITIES: no edema  NEURO: alert and oriented to person/place/time, normal speech,and affect  SKIN: Appears to be a soft tissue very painful mass on the posterior axillary line beneath the posterior shoulder.     I have reviewed the labs and personally reviewed the EKG below and made my comment in the assessment and plan.    Labs:  CBC RESULTS:   Lab Results   Component Value Date    WBC 4.7 01/08/2018    RBC 3.97 01/08/2018    HGB 12.4 01/08/2018    HCT 38.3 01/08/2018    MCV 97 01/08/2018    MCH 31.2 01/08/2018    MCHC 32.4 01/08/2018    RDW 12.4 01/08/2018     01/08/2018       BMP RESULTS:  Lab Results   Component Value Date     01/08/2018    POTASSIUM 3.5 01/08/2018    CHLORIDE 108 01/08/2018    CO2 26 01/08/2018    ANIONGAP 6 01/08/2018    GLC 97 01/08/2018    BUN 6 (L) 01/08/2018    CR 0.65 01/08/2018    GFRESTIMATED >90 01/08/2018    GFRESTBLACK >90 01/08/2018    REJI 8.8 01/08/2018        INR  RESULTS:  Lab Results   Component Value Date    INR 1.11 03/02/2015       Procedures:  Echocardiogram (3/3/2015)  Normal dobutamine stress echocardiogram. No wall motion abnormalities at rest   and with dobutamine infusion. Normal LV size and function at rest and with   dobutamine infusion with LVEF increasing from 55-60% to 70-75%. No ECG   abnormalities at rest and with dobutamine infusion. No chest pain at rest and   with dobutamine infusion. No significant valvular abnormalities on screening   baseline images.    EKG dated 8/25/2017 showed SR,  V1-V3 with T wave inversion.    EKG dated 8/25/2017 shows narrow complex regular tachycardia at a heart rate of 150 bpm. Possible AVNRT    Assessment and Plan:     # SVT status post ablation 2017  -No recurrence    # Rheumatoid arthritis  -Follows with rheumatology.    # Chest pain/pain beneath the left arm.  -There is a soft tissue swelling/mass?  On the left in the posterior axillary line posterior to the shoulder.  Patient has extreme pain to touch on that spot.  -Recommend soft tissue ultrasound    #Chest pain unlikely cardiac   #shortness of breath with activity  -She is euvolemic on exam.  Unlikely cardiac but cannot rule out 100%.  Will check echocardiogram to rule out pericardial effusion given history of rheumatoid arthritis.    Return to clinic as needed.    Total time spent 40 minutes including precharting, face-to-face clinic visit and medical documentation.    Luis Eduardo LANCE MD  [facility] Division of Cardiology  Pager [phone]

## 2024-09-14 LAB
ATRIAL RATE - MUSE: 55 BPM
DIASTOLIC BLOOD PRESSURE - MUSE: NORMAL MMHG
INTERPRETATION ECG - MUSE: NORMAL
P AXIS - MUSE: 60 DEGREES
PR INTERVAL - MUSE: 184 MS
QRS DURATION - MUSE: 108 MS
QT - MUSE: 426 MS
QTC - MUSE: 407 MS
R AXIS - MUSE: -42 DEGREES
SYSTOLIC BLOOD PRESSURE - MUSE: NORMAL MMHG
T AXIS - MUSE: 42 DEGREES
VENTRICULAR RATE- MUSE: 55 BPM

## 2024-09-27 ENCOUNTER — ANCILLARY PROCEDURE (OUTPATIENT)
Dept: ULTRASOUND IMAGING | Facility: CLINIC | Age: 54
End: 2024-09-27
Attending: INTERNAL MEDICINE
Payer: COMMERCIAL

## 2024-09-27 ENCOUNTER — ANCILLARY PROCEDURE (OUTPATIENT)
Dept: CARDIOLOGY | Facility: CLINIC | Age: 54
End: 2024-09-27
Attending: INTERNAL MEDICINE
Payer: COMMERCIAL

## 2024-09-27 DIAGNOSIS — Z98.890 HISTORY OF CARDIAC RADIOFREQUENCY ABLATION (RFA): ICD-10-CM

## 2024-09-27 DIAGNOSIS — R07.89 CHEST WALL PAIN: ICD-10-CM

## 2024-09-27 LAB — LVEF ECHO: NORMAL

## 2024-09-27 PROCEDURE — 76604 US EXAM CHEST: CPT | Mod: 52 | Performed by: RADIOLOGY

## 2024-09-27 PROCEDURE — 93306 TTE W/DOPPLER COMPLETE: CPT | Performed by: INTERNAL MEDICINE

## 2025-03-05 ENCOUNTER — OFFICE VISIT (OUTPATIENT)
Dept: DERMATOLOGY | Facility: CLINIC | Age: 55
End: 2025-03-05
Payer: COMMERCIAL

## 2025-03-05 DIAGNOSIS — Z79.899 LONG-TERM USE OF PLAQUENIL: ICD-10-CM

## 2025-03-05 DIAGNOSIS — M06.09 SERONEGATIVE RHEUMATOID ARTHRITIS OF MULTIPLE SITES (H): ICD-10-CM

## 2025-03-05 DIAGNOSIS — L30.9 DERMATITIS: Primary | ICD-10-CM

## 2025-03-05 PROCEDURE — 99203 OFFICE O/P NEW LOW 30 MIN: CPT | Performed by: PHYSICIAN ASSISTANT

## 2025-03-05 PROCEDURE — 1126F AMNT PAIN NOTED NONE PRSNT: CPT | Performed by: PHYSICIAN ASSISTANT

## 2025-03-05 RX ORDER — HYDROCORTISONE 25 MG/G
OINTMENT TOPICAL 2 TIMES DAILY
Qty: 30 G | Refills: 1 | Status: SHIPPED | OUTPATIENT
Start: 2025-03-05

## 2025-03-05 RX ORDER — DIPHENHYDRAMINE HYDROCHLORIDE 25 MG/1
25 CAPSULE ORAL
COMMUNITY
Start: 2025-02-06

## 2025-03-05 ASSESSMENT — PAIN SCALES - GENERAL: PAINLEVEL_OUTOF10: NO PAIN (0)

## 2025-03-05 NOTE — PROGRESS NOTES
[facility] Dermatology Note  Encounter Date: Mar 5, 2025  Office Visit     Dermatology Problem List:  1. Seronegative Rheumatoid Arthritis of Multiple Sites  - current tx: Hydroxychloroquine 200 mg, Prednisone 5 mg  2. Dermatitis  - current tx: Hydrocortisone 2.5% ointment  ____________________________________________    Assessment & Plan:    # Seronegative Rheumatoid Arthritis of Multiple Sites.   -  previously monitored by Rheumatology through a different health care system.   - referral placed to rheumatology to restart monitoring to restart mediations (previously on Hydroxychloroquine 200 mg BID and Prednisone 5 mg daily;)    # Dermatitis, face and upper back.   - start Hydrocortisone 2.5% ointment BID to rashes PRN for up to two weeks until resolved  Gentle skin cares discussed. Continue Vaseline to these areas.   # Post inflammatory hyperpigmentation present on forearms.   Will improve with time.     Procedures Performed:   None      Follow-up: 6 month(s) in-person, or earlier for new or changing lesions    Staff and Scribe:   Scribe Disclosure:   By signing my name below, I, Layla Nguyễn, attest that this documentation has been prepared under the direction and in the presence of Lisbeth Underwood PA-C.  - Electronically Signed: Layla Nguyễn 03/05/25       Provider Disclosure:  I agree with above History, Review of Systems, Physical exam and Plan.  I have reviewed the content of the documentation and have edited it as needed. I have personally performed the services documented here and the documentation accurately represents those services and the decisions I have made.      Electronically signed by:  All risks, benefits and alternatives were discussed with patient.  Patient is in agreement and understands the assessment and plan.  All questions were answered.  Sun Screen Education was given.   Return to Clinic in 6 months or sooner as needed.   Lisbeth Underwood PA-C         ____________________________________________    CC: Derm Problem (Pt reports pain in joints and swollen hands. She also reports itching all over the body for almost 7 months. Itching gets worse at night.)    HPI:  Ms. Asa Francis is a(n) 55 year old female who presents today as a new patient for itching. A remote Latvian interpretor was used for translation. Pt reports itching and rashes has improved with time. She uses a honey based cleanser on her face.     Patient reports she has to get a new rheumatologist, but has not yet. She used to be on Plaquenil, prednisone and used to get infusions of infliximab. She reports the dark marks she has noticed on her body do not itch but previously had rashes on the forearms. She reports the cheeks are the main areas she has been itching. She also uses vaseline on her face for moisture. Denies previous dx of lupus.    Patient is otherwise feeling well, without additional skin concerns.    Labs Reviewed:  Rheumatology note/information  from 07/19/2024 reviewed.    Physical exam:  Vitals: There were no vitals taken for this visit.  GEN: This is a well developed, well-nourished female in no acute distress, in a pleasant mood.    SKIN: Focused examination of the face, upper back and upper extremities including hands was performed.  Erythema noted to cheeks and mild xerosis. Mild xerosis to the upper back with a few excoriations.   Soft tissue swelling noted to the right elbow, right wrist.   Faint hyperpigmented patches on the left forearm.    - No other lesions of concern on areas examined.                                 Medications:  Current Outpatient Medications   Medication Sig Dispense Refill    albuterol (PROAIR HFA, PROVENTIL HFA, VENTOLIN HFA) 108 (90 BASE) MCG/ACT inhaler Inhale 2 puffs into the lungs every 6 hours      cholecalciferol (VITAMIN D) 1000 UNIT tablet Take 1,000 Units by mouth      cyanocobalamin 1000 MCG TABS Take 1,000 mcg by mouth       Emollient (EUCERIN) LOTN Apply to face/ body twice daily as needed.      loratadine (CLARITIN) 10 MG tablet Take 10 mg by mouth      LORAZEPAM PO Take 0.5 mg by mouth every 8 hours as needed for anxiety      meclizine (ANTIVERT) 12.5 MG tablet Take 1 tablet (12.5 mg) by mouth 3 times daily as needed for dizziness 20 tablet 0    MIRTAZAPINE PO Take 45 mg by mouth At Bedtime      ondansetron (ZOFRAN-ODT) 4 MG disintegrating tablet Take 1 tablet (4 mg) by mouth every 6 hours as needed for nausea 20 tablet 0    pantoprazole (PROTONIX) 20 MG tablet Take by mouth 30-60 minutes before a meal. 30 tablet 0    valACYclovir (VALTREX) 1000 mg tablet Take 1 g by mouth       No current facility-administered medications for this visit.      Past Medical History:   Patient Active Problem List   Diagnosis    Chest pain    S/P catheter ablation of slow pathway     Past Medical History:   Diagnosis Date    Diabetes (H)     Hypertension     MDD (major depressive disorder)     PTSD (post-traumatic stress disorder)     4 children killed by stray bomb in [facility]    SVT (supraventricular tachycardia)         CC Fortunato Flores MD  [facility]  [address] [place] [address] on close of this encounter.

## 2025-03-05 NOTE — LETTER
3/5/2025       RE: Asa Francis  [address]  [place] [address]     Dear Colleague,    Thank you for referring your patient, Asa Francis, to the [facility] DERMATOLOGY CLINIC [place] [facility]. Please see a copy of my visit note below.    [facility] Dermatology Note  Encounter Date: Mar 5, 2025  Office Visit     Dermatology Problem List:  1. Seronegative Rheumatoid Arthritis of Multiple Sites  - current tx: Hydroxychloroquine 200 mg, Prednisone 5 mg  2. Dermatitis  - current tx: Hydrocortisone 2.5% ointment  ____________________________________________    Assessment & Plan:    # Seronegative Rheumatoid Arthritis of Multiple Sites.   -  previously monitored by Rheumatology through a different health care system.   - referral placed to rheumatology to restart monitoring to restart mediations (previously on Hydroxychloroquine 200 mg BID and Prednisone 5 mg daily;)    # Dermatitis, face and upper back.   - start Hydrocortisone 2.5% ointment BID to rashes PRN for up to two weeks until resolved  Gentle skin cares discussed. Continue Vaseline to these areas.   # Post inflammatory hyperpigmentation present on forearms.   Will improve with time.     Procedures Performed:   None      Follow-up: 6 month(s) in-person, or earlier for new or changing lesions    Staff and Scribe:   Scribe Disclosure:   By signing my name below, I, Layla Nguyễn, attest that this documentation has been prepared under the direction and in the presence of Lisbeth Underwood PA-C.  - Electronically Signed: Layla Nguyễn 03/05/25       Provider Disclosure:  I agree with above History, Review of Systems, Physical exam and Plan.  I have reviewed the content of the documentation and have edited it as needed. I have personally performed the services documented here and the documentation accurately represents those services and the decisions I have made.       Electronically signed by:  All risks, benefits and alternatives were discussed with patient.  Patient is in agreement and understands the assessment and plan.  All questions were answered.  Sun Screen Education was given.   Return to Clinic in 6 months or sooner as needed.   Lisbeth Underwood PA-C        ____________________________________________    CC: Derm Problem (Pt reports pain in joints and swollen hands. She also reports itching all over the body for almost 7 months. Itching gets worse at night.)    HPI:  Ms. Asa Francis is a(n) 55 year old female who presents today as a new patient for itching. A remote [employer] interpretor was used for translation. Pt reports itching and rashes has improved with time. She uses a honey based cleanser on her face.     Patient reports she has to get a new rheumatologist, but has not yet. She used to be on Plaquenil, prednisone and used to get infusions of infliximab. She reports the dark marks she has noticed on her body do not itch but previously had rashes on the forearms. She reports the cheeks are the main areas she has been itching. She also uses vaseline on her face for moisture. Denies previous dx of lupus.    Patient is otherwise feeling well, without additional skin concerns.    Labs Reviewed:  Rheumatology note/information  from 07/19/2024 reviewed.    Physical exam:  Vitals: There were no vitals taken for this visit.  GEN: This is a well developed, well-nourished female in no acute distress, in a pleasant mood.    SKIN: Focused examination of the face, upper back and upper extremities including hands was performed.  Erythema noted to cheeks and mild xerosis. Mild xerosis to the upper back with a few excoriations.   Soft tissue swelling noted to the right elbow, right wrist.   Faint hyperpigmented patches on the left forearm.    - No other lesions of concern on areas examined.                                 Medications:  Current Outpatient Medications    Medication Sig Dispense Refill     albuterol (PROAIR HFA, PROVENTIL HFA, VENTOLIN HFA) 108 (90 BASE) MCG/ACT inhaler Inhale 2 puffs into the lungs every 6 hours       cholecalciferol (VITAMIN D) 1000 UNIT tablet Take 1,000 Units by mouth       cyanocobalamin 1000 MCG TABS Take 1,000 mcg by mouth       Emollient (EUCERIN) LOTN Apply to face/ body twice daily as needed.       loratadine (CLARITIN) 10 MG tablet Take 10 mg by mouth       LORAZEPAM PO Take 0.5 mg by mouth every 8 hours as needed for anxiety       meclizine (ANTIVERT) 12.5 MG tablet Take 1 tablet (12.5 mg) by mouth 3 times daily as needed for dizziness 20 tablet 0     MIRTAZAPINE PO Take 45 mg by mouth At Bedtime       ondansetron (ZOFRAN-ODT) 4 MG disintegrating tablet Take 1 tablet (4 mg) by mouth every 6 hours as needed for nausea 20 tablet 0     pantoprazole (PROTONIX) 20 MG tablet Take by mouth 30-60 minutes before a meal. 30 tablet 0     valACYclovir (VALTREX) 1000 mg tablet Take 1 g by mouth       No current facility-administered medications for this visit.      Past Medical History:   Patient Active Problem List   Diagnosis     Chest pain     S/P catheter ablation of slow pathway     Past Medical History:   Diagnosis Date     Diabetes (H)      Hypertension      MDD (major depressive disorder)      PTSD (post-traumatic stress disorder)     4 children killed by stray bomb in [place]     SVT (supraventricular tachycardia)         CC Fortunato Flores MD  [facility]  [address] [place] [address] on close of this encounter.      Again, thank you for allowing me to participate in the care of your patient.      Sincerely,    Lisbeth Underwood PA-C

## 2025-03-05 NOTE — NURSING NOTE
Dermatology Rooming Note    Asa Francis's goals for this visit include:   Chief Complaint   Patient presents with    Derm Problem     Pt reports pain in joints and swollen hands. She also reports itching all over the body for almost 7 months. Itching gets worse at night.      Edmundo Lu, Visit Facilitator

## 2025-03-05 NOTE — PATIENT INSTRUCTIONS
Gentle Skin Care    Below is a list of products our providers recommend for gentle skin care.    Moisturizers:  Lighter: Exederm Intensive Moisture Cream, Cetaphil Cream, CeraVe, Aveeno Positively Radiant and Vanicream Light   Thicker: Aquaphor Ointment, Vaseline, Petroleum Jelly, Eucerin Original Healing Cream, Vanicream Cream, CeraVe Healing Ointment, Aquaphor Body Spray  Avoid Lotions (too thin)  Mild Cleansers:  Dove Fragrance-free Bar or Wash  CeraVe   Vanicream Cleansing Bar  Cetaphil Cleanser   Aquaphor 2-in-1 Gentle Wash and Shampoo  Dove Baby Wash  Exederm Body Wash       Laundry Products:  All Free and Clear Products  Cheer Free  Generic brands are okay as long as they are fragrance-free  Avoid fabric softeners and dryer sheets   Sunscreens: SPF 30 or greater   Sunscreens that contain zinc oxide and/or titanium dioxide should be applied. These are physical blockers. One or both of these should be listed in the active Ingredients.  Any other listed ingredients under the active ingredients would be a chemically-based sunscreen, which might be irritating.  Spray sunscreens should be avoided because these are typically chemical sunscreens.      Shampoo and Conditioners:  Free and Clear by Vanicream  Aquaphor 2-in-1 Gentle Wash and Shampoo   Oils:  Mineral Oil   Emu Oil   For some patients: coconut (raw, unrefined, organic) and sunflower seed oil      Keep in mind:  Generic products are an okay substitute, but make sure they are fragrance-free.  Reading the product ingredients list is very important.  Avoid product that have fragrance added to them.   Organic does not mean fragrance-free. In fact, patients with sensitive skin can become quite irritated by some organic products.     Recommendations:  Daily bathing. Make sure you are applying a good moisturizer after bathing every time.  Apply moisturizing creams at least twice daily to the whole body. Your provider may recommend a lighter or heavier  moisturizer based on the severity of your skin condition and that time of year it is.  Creams are more moisturizing than lotions.     Care Plan:  Keep bathing and showering short, less than 15 minutes.  Always use lukewarm warm when possible. AVOID hot or cold water.  DO NOT use bubble bath.  Limit the use of soaps. Focus on skin folds, face, armpits, groin, and feet towards the end of the bath.  Do NOT vigorously scrub when you cleanse the skin.  After bathing, PAT your skin lightly with a towel. DO NOT rub or scrub when drying.  ALWAYS apply a moisturizer immediately after bathing. This helps to lock in moisture. *IF YOU WERE PRESCRIBED A TOPICAL MEDICATION, APPLY YOUR MEDICATION FIRST, AND THEN COVER WITH YOUR DAILY MOISTURIZER.*  Reapply moisturizing agents to your whole body at least twice daily.    Other helpful tips:  Do not use products such as powders, perfumes, or colognes on your skin.  Diffusers can be harsh on sensitive skin. Use with caution if you have sensitive skin.  Avoid saunas and steam baths. This temperature is too hot.  Avoid tight or scratchy clothing, such as wool.  Always wash new clothing before wearing them for the first time.  Sometimes a humidifier or vaporizer can be used at night can help the dry skin. Remember to keep these items clean to avoid mold growth.    Who should I call with questions?  [facility]: [phone]  [facility]: [phone]  For urgent needs outside of business hours call the [facility] at [phone] and ask for the dermatology resident on call.

## 2025-05-02 ENCOUNTER — ANCILLARY PROCEDURE (OUTPATIENT)
Dept: GENERAL RADIOLOGY | Facility: CLINIC | Age: 55
End: 2025-05-02
Attending: STUDENT IN AN ORGANIZED HEALTH CARE EDUCATION/TRAINING PROGRAM
Payer: COMMERCIAL

## 2025-05-02 DIAGNOSIS — M06.09 SERONEGATIVE RHEUMATOID ARTHRITIS OF MULTIPLE SITES (H): ICD-10-CM

## 2025-05-02 PROCEDURE — 73120 X-RAY EXAM OF HAND: CPT | Mod: LT | Performed by: RADIOLOGY

## 2025-05-02 PROCEDURE — 73080 X-RAY EXAM OF ELBOW: CPT | Mod: RT | Performed by: RADIOLOGY

## 2025-05-05 DIAGNOSIS — Z22.7 TB LUNG, LATENT: Primary | ICD-10-CM

## 2025-05-06 ENCOUNTER — TELEPHONE (OUTPATIENT)
Dept: RHEUMATOLOGY | Facility: CLINIC | Age: 55
End: 2025-05-06
Payer: COMMERCIAL

## 2025-05-06 DIAGNOSIS — Z22.7 TB LUNG, LATENT: Primary | ICD-10-CM

## 2025-05-06 NOTE — TELEPHONE ENCOUNTER
Infectious Disease Referral    Referral Date: 5/5    Name of Referring Provider/Clinic: Dr. Hanna, [facility] Rheumatology    Referral Dx: Plan to start Infliximab, pt reports hx of latent TB treatment but that she did not complete treatment course.     Urgent? routine    Information Received:  5/2/25 OV note from Dr. Hanna   No fevers; lungs clear;    4/18/25 PCP note from [facility]: Denies fever, chills, swelling, pain, or any signs of infection. No history of recent travel, new medications, or known allergen exposure. No other systemic symptoms reported. Patient is otherwise feeling well.     5/2 positive Quantiferon TB test    7/24/24 CXR: No acute findings and no significant changes from the prior exam     Information Needed:  Recent CXR per protocol    Call to Ganesh Guzman, TB , [facility]. She had two incomplete attempts to complete LTBI medications.   2015: stopped Rifampin   2021, stopped INH after received refill # 3     New Referral Triage: LTBI    Is the patient a pre-transplant Eval: No    Is the patient Symptomatic (Lungs, GI, Lymph nodes, eyes): No    If yes to lungs: N/A   - Do they currently have a cough:   - Phlegm/ sputum production:   - Fever/ Chills/ Night sweats:   - Difficulty in breathing:   - Unintentional weight loss in last 3 months:     - Duration of symptoms:    Chest imaging available within last month: Last  CXR was in July, 2024 and was normal   If yes- Normal or abnormal:    If patient is experiencing symptoms AND Abnormal imaging: Order sputum AFB x3 (8 hours apart). N/A    Message sent to Ambulatory Infection Prevention Pool for suspected Active TB flag: N/A    Scheduling protocol:  If active TB is ruled out- schedule for routine visit. Virtual is okay.    If pre-transplant workup- schedule as urgent with any Gen ID provider.    If suspected active TB- Schedule soonest available appointment virtually.     Routed to Dr. Hanna; requested  that she order/arrange CXR.

## 2025-05-07 ENCOUNTER — APPOINTMENT (OUTPATIENT)
Dept: INTERPRETER SERVICES | Facility: CLINIC | Age: 55
End: 2025-05-07
Payer: COMMERCIAL

## 2025-05-07 NOTE — TELEPHONE ENCOUNTER
Called and informed of CXR via  services. Pt was in a class and will call back with any further questions.

## 2025-05-08 ENCOUNTER — TELEPHONE (OUTPATIENT)
Dept: RHEUMATOLOGY | Facility: CLINIC | Age: 55
End: 2025-05-08
Payer: COMMERCIAL

## 2025-05-08 NOTE — TELEPHONE ENCOUNTER
MTM appointment no showed, we made one more attempt to reschedule.     Routing back to referring provider and MTM Pharmacist Team    [facility]

## 2025-05-20 ENCOUNTER — TELEPHONE (OUTPATIENT)
Dept: PHARMACY | Facility: CLINIC | Age: 55
End: 2025-05-20
Payer: COMMERCIAL

## 2025-05-20 NOTE — TELEPHONE ENCOUNTER
Patient no showed appt with Tariq on 5/7 and business coordinators were unable to contact patient after no show.     Using an , call placed to pt to schedule visit with Julio or Caryn       Outcome: LVM

## 2025-05-28 ENCOUNTER — VIRTUAL VISIT (OUTPATIENT)
Dept: PHARMACY | Facility: CLINIC | Age: 55
End: 2025-05-28
Attending: STUDENT IN AN ORGANIZED HEALTH CARE EDUCATION/TRAINING PROGRAM
Payer: COMMERCIAL

## 2025-05-28 DIAGNOSIS — M06.9 RHEUMATOID ARTHRITIS (H): Primary | ICD-10-CM

## 2025-05-28 DIAGNOSIS — Z71.85 VACCINE COUNSELING: ICD-10-CM

## 2025-05-28 NOTE — PROGRESS NOTES
Medication Therapy Management (MTM) Encounter    ASSESSMENT:                            Medication Adherence/Access: Would benefit from re-attempting coverage for infliximab if ID feels it is appropriate pending additional TB testing.    Rheumatoid arthritis: Patient previously well controlled on infliximab, has not tolerated several other disease modifying medications. Note positive TB, was treated for latent TB in the past but requires follow up with ID to determine if this was successfully treated before pursing infliximab. Infliximab provided today including administration, monitoring, common and serious side effects (including risk of infusion reaction, infection and small risk of malignancy), and time to effectiveness.    Vaccine counseling: Indicated for Shingrix and Prevnar 20 per ACIP recommendations.     PLAN:                            Please complete chest x-rays as scheduled 5/30/25 and follow up with ID regarding necessity of TB treatment.  If safe per ID, we will pursue coverage for infliximab 3 mg/kg at weeks 0, 2, and 6, then every 8 weeks thereafter.    Follow-up: 2 weeks to review for ID follow, reassess plan for infliximab and coordinate infusions if appropriate. Needs CMR at follow up, did not conduct today to prevent overwhelming patient with information. Sees rheumatologist 6/20/25.    SUBJECTIVE/OBJECTIVE:                          Asa Francis is a 55 year old female called for an initial visit. She was referred to me from Bertha Hanna MD. Patient was contacted via a [employer] .    Reason for visit: Infliximab restart.    Allergies/ADRs: Reviewed in chart  Past Medical History: Reviewed in chart  Tobacco: She reports that she has never smoked. She has quit using smokeless tobacco.  Alcohol: not assessed today    Medication Adherence/Access: Previously went off infliximab due to insurance issues.    Rheumatoid arthritis: Patient is not currently taking any disease  modifying medications. She has not tolerated multiple medications, but does not remember their names. She did have benefit from infliximab and tolerated this well, went off due to insurance issues and would now like to restart. It is difficult for her to stand up and leave her house right now due to the pain. She would be open to doing a burst of prednisone if provider feels it is appropriate. She is aware that she needs to see ID before starting the infliximab, has x-rays scheduled for 5/30/25 and will follow up with them after that appointment.    Previously tried (per chart review): hydroxychloroquine (itching), methotrexate (itching)    TB positive 5/2/25  Hep B/C negative 5/2021 via [facility]    Vaccine counseling: Patient is open to receiving recommended vaccines.    Immunization History   Administered Date(s) Administered    COVID-19 12+ (MODERNA) 11/18/2024    COVID-19 Vaccine (Mariam) 04/12/2021, 11/05/2021    Hepatitis A (VAQTA)(ADULT 19+) 11/28/2022    Hepatitis B, Adult (Energix-B/Recombivax HB) 03/26/2014, 03/30/2014    INFLUENZA,TRIVALENT (FLUCELVAX) 11/18/2024    Influenza Vaccine >6 months,quad, PF 10/20/2014, 09/30/2015, 03/02/2017, 09/06/2018, 09/16/2019, 11/25/2020, 11/07/2022    Influenza,INJ,MDCK,PF,Quad >6mo(Flucelvax) 10/04/2023    MMR (MMRII) 03/26/2014, 03/30/2014, 10/20/2014    Pneumococcal 23 valent 03/02/2017    TD,PF 7+ (Tenivac) 03/26/2014    TDAP (Adacel,Boostrix) 09/22/2014, 10/29/2014, 11/28/2022    Td (Adult), Adsorbed 09/22/2014    Typhoid IM 11/28/2022    Varicella (Varivax) 02/25/2016       Today's Vitals: There were no vitals taken for this visit.  ----------------    I spent 40 minutes with this patient today. All changes were made via collaborative practice agreement with Bertha Hanna MD. A copy of the visit note was provided to the patient's provider(s).    A summary of these recommendations was sent via [employer]    Kati Jimenez PharmD  Medication Therapy  Management Pharmacist  [facility]     Telemedicine Visit Details  Type of service:  Telephone visit  Originating (patient) location: home  Distant (provider) location: off-site  Start Time: 1430  End Time: 1510     Medication Therapy Recommendations  No medication therapy recommendations to display

## 2025-05-29 NOTE — PATIENT INSTRUCTIONS
"Recommendations from today's MTM visit:                                                      Please complete chest x-rays as scheduled 5/30/25 and follow up with ID regarding necessity of TB treatment.  If safe per ID, we will pursue coverage for infliximab 3 mg/kg at weeks 0, 2, and 6, then every 8 weeks thereafter.    Follow-up: 2 weeks to review for ID follow, reassess plan for infliximab and coordinate infusions if appropriate. Needs CMR at follow up, did not conduct today to prevent overwhelming patient with information. Sees rheumatologist 6/20/25.    It was great speaking with you today.  I value your experience and would be very thankful for your time in providing feedback in our clinic survey. In the next few days, you may receive an email or text message from [employer] with a link to a survey related to your  clinical pharmacist.\"     To schedule another MTM appointment, please call the clinic directly or you may call the MTM scheduling line at [phone].    My Clinical Pharmacist's contact information:                                                      Please feel free to contact me with any questions or concerns you have.      Kati Jimenez, PharmD  Medication Therapy Management Pharmacist  [facility]    "

## 2025-05-30 ENCOUNTER — ANCILLARY PROCEDURE (OUTPATIENT)
Dept: GENERAL RADIOLOGY | Facility: CLINIC | Age: 55
End: 2025-05-30
Attending: STUDENT IN AN ORGANIZED HEALTH CARE EDUCATION/TRAINING PROGRAM
Payer: COMMERCIAL

## 2025-05-30 ENCOUNTER — ANCILLARY PROCEDURE (OUTPATIENT)
Dept: ULTRASOUND IMAGING | Facility: CLINIC | Age: 55
End: 2025-05-30
Attending: STUDENT IN AN ORGANIZED HEALTH CARE EDUCATION/TRAINING PROGRAM
Payer: COMMERCIAL

## 2025-05-30 DIAGNOSIS — M06.09 SERONEGATIVE RHEUMATOID ARTHRITIS OF MULTIPLE SITES (H): ICD-10-CM

## 2025-05-30 DIAGNOSIS — Z22.7 TB LUNG, LATENT: ICD-10-CM

## 2025-05-30 PROCEDURE — 76881 US COMPL JOINT R-T W/IMG: CPT | Performed by: RADIOLOGY

## 2025-05-30 PROCEDURE — 71046 X-RAY EXAM CHEST 2 VIEWS: CPT | Mod: GC | Performed by: RADIOLOGY

## 2025-07-03 ENCOUNTER — OFFICE VISIT (OUTPATIENT)
Dept: INFECTIOUS DISEASES | Facility: CLINIC | Age: 55
End: 2025-07-03
Attending: STUDENT IN AN ORGANIZED HEALTH CARE EDUCATION/TRAINING PROGRAM
Payer: COMMERCIAL

## 2025-07-03 VITALS
OXYGEN SATURATION: 100 % | TEMPERATURE: 97.7 F | SYSTOLIC BLOOD PRESSURE: 107 MMHG | DIASTOLIC BLOOD PRESSURE: 73 MMHG | HEART RATE: 67 BPM | WEIGHT: 162.25 LBS | BODY MASS INDEX: 27 KG/M2

## 2025-07-03 DIAGNOSIS — Z22.7 TB LUNG, LATENT: ICD-10-CM

## 2025-07-03 PROCEDURE — 3078F DIAST BP <80 MM HG: CPT | Performed by: INTERNAL MEDICINE

## 2025-07-03 PROCEDURE — 3074F SYST BP LT 130 MM HG: CPT | Performed by: INTERNAL MEDICINE

## 2025-07-03 PROCEDURE — G0463 HOSPITAL OUTPT CLINIC VISIT: HCPCS | Performed by: INTERNAL MEDICINE

## 2025-07-03 PROCEDURE — 99205 OFFICE O/P NEW HI 60 MIN: CPT | Performed by: INTERNAL MEDICINE

## 2025-07-03 PROCEDURE — G2211 COMPLEX E/M VISIT ADD ON: HCPCS | Performed by: INTERNAL MEDICINE

## 2025-07-03 PROCEDURE — 1126F AMNT PAIN NOTED NONE PRSNT: CPT | Performed by: INTERNAL MEDICINE

## 2025-07-03 RX ORDER — RIFABUTIN 150 MG/1
300 CAPSULE ORAL DAILY
Qty: 60 CAPSULE | Refills: 3 | Status: SHIPPED | OUTPATIENT
Start: 2025-07-03

## 2025-07-03 ASSESSMENT — PAIN SCALES - GENERAL: PAINLEVEL_OUTOF10: NO PAIN (0)

## 2025-07-03 NOTE — PATIENT INSTRUCTIONS
Start rifabutin daily with plans to continue for 4 months.  Let us know right away if you have any new symptoms that make you want to stop taking the medication.  Return to see me in 1 month.

## 2025-07-03 NOTE — NURSING NOTE
Chief Complaint   Patient presents with    Consult     Tb Lung Latent      /73 (BP Location: Right arm, Cuff Size: Adult Large)   Pulse 67   Temp 97.7  F (36.5  C)   Wt 73.6 kg (162 lb 4 oz)   LMP  (LMP Unknown)   SpO2 100%   BMI 27.00 kg/m    María Thomas on 7/3/2025 at 8:06 AM
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
## 2025-07-10 ENCOUNTER — TELEPHONE (OUTPATIENT)
Dept: PHARMACY | Facility: CLINIC | Age: 55
End: 2025-07-10
Payer: COMMERCIAL

## (undated) RX ORDER — DOBUTAMINE HYDROCHLORIDE 200 MG/100ML
INJECTION INTRAVENOUS
Status: DISPENSED
Start: 2018-01-09

## (undated) RX ORDER — SODIUM CHLORIDE 9 MG/ML
INJECTION, SOLUTION INTRAVENOUS
Status: DISPENSED
Start: 2017-10-26

## (undated) RX ORDER — FENTANYL CITRATE 50 UG/ML
INJECTION, SOLUTION INTRAMUSCULAR; INTRAVENOUS
Status: DISPENSED
Start: 2017-10-26

## (undated) RX ORDER — METOPROLOL TARTRATE 1 MG/ML
INJECTION, SOLUTION INTRAVENOUS
Status: DISPENSED
Start: 2018-01-09